# Patient Record
Sex: MALE | Race: BLACK OR AFRICAN AMERICAN | Employment: OTHER | URBAN - METROPOLITAN AREA
[De-identification: names, ages, dates, MRNs, and addresses within clinical notes are randomized per-mention and may not be internally consistent; named-entity substitution may affect disease eponyms.]

---

## 2017-02-11 ENCOUNTER — IP HISTORICAL/CONVERTED ENCOUNTER (OUTPATIENT)
Dept: OTHER | Age: 67
End: 2017-02-11

## 2022-10-07 ENCOUNTER — HOSPITAL ENCOUNTER (EMERGENCY)
Age: 72
Discharge: HOME | End: 2022-10-07
Attending: EMERGENCY MEDICINE | Admitting: EMERGENCY MEDICINE
Payer: MEDICARE

## 2022-10-07 ENCOUNTER — APPOINTMENT (OUTPATIENT)
Dept: GENERAL RADIOLOGY | Age: 72
End: 2022-10-07
Attending: EMERGENCY MEDICINE
Payer: MEDICARE

## 2022-10-07 ENCOUNTER — APPOINTMENT (OUTPATIENT)
Dept: CT IMAGING | Age: 72
End: 2022-10-07
Attending: EMERGENCY MEDICINE
Payer: MEDICARE

## 2022-10-07 VITALS
BODY MASS INDEX: 17.77 KG/M2 | TEMPERATURE: 97.8 F | OXYGEN SATURATION: 99 % | DIASTOLIC BLOOD PRESSURE: 91 MMHG | HEIGHT: 69 IN | SYSTOLIC BLOOD PRESSURE: 171 MMHG | RESPIRATION RATE: 15 BRPM | HEART RATE: 56 BPM | WEIGHT: 120 LBS

## 2022-10-07 DIAGNOSIS — C80.1 MALIGNANCY (HCC): Primary | ICD-10-CM

## 2022-10-07 DIAGNOSIS — R00.1 BRADYCARDIA: ICD-10-CM

## 2022-10-07 LAB
ALBUMIN SERPL-MCNC: 2.9 G/DL (ref 3.5–5)
ALBUMIN/GLOB SERPL: 0.9 {RATIO} (ref 1.1–2.2)
ALP SERPL-CCNC: 151 U/L (ref 45–117)
ALT SERPL-CCNC: 16 U/L (ref 12–78)
ANION GAP SERPL CALC-SCNC: 7 MMOL/L (ref 5–15)
AST SERPL W P-5'-P-CCNC: 26 U/L (ref 15–37)
ATRIAL RATE: 67 BPM
BASOPHILS # BLD: 0.1 K/UL (ref 0–0.1)
BASOPHILS NFR BLD: 1 % (ref 0–1)
BILIRUB SERPL-MCNC: 0.4 MG/DL (ref 0.2–1)
BUN SERPL-MCNC: 20 MG/DL (ref 6–20)
BUN/CREAT SERPL: 5 (ref 12–20)
CA-I BLD-MCNC: 9.4 MG/DL (ref 8.5–10.1)
CALCULATED P AXIS, ECG09: 60 DEGREES
CALCULATED R AXIS, ECG10: 53 DEGREES
CALCULATED T AXIS, ECG11: 74 DEGREES
CHLORIDE SERPL-SCNC: 105 MMOL/L (ref 97–108)
CO2 SERPL-SCNC: 27 MMOL/L (ref 21–32)
CREAT SERPL-MCNC: 4.07 MG/DL (ref 0.7–1.3)
DIAGNOSIS, 93000: NORMAL
DIFFERENTIAL METHOD BLD: ABNORMAL
EOSINOPHIL # BLD: 0.2 K/UL (ref 0–0.4)
EOSINOPHIL NFR BLD: 2 % (ref 0–7)
ERYTHROCYTE [DISTWIDTH] IN BLOOD BY AUTOMATED COUNT: 14.9 % (ref 11.5–14.5)
GLOBULIN SER CALC-MCNC: 3.4 G/DL (ref 2–4)
GLUCOSE SERPL-MCNC: 85 MG/DL (ref 65–100)
HCT VFR BLD AUTO: 38.2 % (ref 36.6–50.3)
HGB BLD-MCNC: 12.2 G/DL (ref 12.1–17)
IMM GRANULOCYTES # BLD AUTO: 0 K/UL (ref 0–0.04)
IMM GRANULOCYTES NFR BLD AUTO: 1 % (ref 0–0.5)
LYMPHOCYTES # BLD: 1.2 K/UL (ref 0.8–3.5)
LYMPHOCYTES NFR BLD: 14 % (ref 12–49)
MCH RBC QN AUTO: 29.3 PG (ref 26–34)
MCHC RBC AUTO-ENTMCNC: 31.9 G/DL (ref 30–36.5)
MCV RBC AUTO: 91.6 FL (ref 80–99)
MONOCYTES # BLD: 0.9 K/UL (ref 0–1)
MONOCYTES NFR BLD: 10 % (ref 5–13)
NEUTS SEG # BLD: 6.4 K/UL (ref 1.8–8)
NEUTS SEG NFR BLD: 72 % (ref 32–75)
NRBC # BLD: 0 K/UL (ref 0–0.01)
NRBC BLD-RTO: 0 PER 100 WBC
P-R INTERVAL, ECG05: 156 MS
PLATELET # BLD AUTO: 305 K/UL (ref 150–400)
PMV BLD AUTO: 11 FL (ref 8.9–12.9)
POTASSIUM SERPL-SCNC: 3.4 MMOL/L (ref 3.5–5.1)
PROT SERPL-MCNC: 6.3 G/DL (ref 6.4–8.2)
Q-T INTERVAL, ECG07: 434 MS
QRS DURATION, ECG06: 86 MS
QTC CALCULATION (BEZET), ECG08: 458 MS
RBC # BLD AUTO: 4.17 M/UL (ref 4.1–5.7)
SODIUM SERPL-SCNC: 139 MMOL/L (ref 136–145)
TROPONIN-HIGH SENSITIVITY: 114 NG/L (ref 0–76)
VENTRICULAR RATE, ECG03: 67 BPM
WBC # BLD AUTO: 8.8 K/UL (ref 4.1–11.1)

## 2022-10-07 PROCEDURE — 93005 ELECTROCARDIOGRAM TRACING: CPT

## 2022-10-07 PROCEDURE — 36415 COLL VENOUS BLD VENIPUNCTURE: CPT

## 2022-10-07 PROCEDURE — 71045 X-RAY EXAM CHEST 1 VIEW: CPT

## 2022-10-07 PROCEDURE — 99285 EMERGENCY DEPT VISIT HI MDM: CPT

## 2022-10-07 PROCEDURE — 80053 COMPREHEN METABOLIC PANEL: CPT

## 2022-10-07 PROCEDURE — 84484 ASSAY OF TROPONIN QUANT: CPT

## 2022-10-07 PROCEDURE — 71250 CT THORAX DX C-: CPT

## 2022-10-07 PROCEDURE — 85025 COMPLETE CBC W/AUTO DIFF WBC: CPT

## 2022-10-07 NOTE — DISCHARGE INSTRUCTIONS
Thank you! Thank you for allowing me to care for you in the emergency department. It is my goal to provide you with excellent care. If you have not received excellent quality care, please ask to speak to the nurse manager. Please fill out the survey that will come to you by mail or email since we listen to your feedback! Below you will find a list of your tests from today's visit. Should you have any questions, please do not hesitate to call the emergency department. Labs  Recent Results (from the past 12 hour(s))   EKG, 12 LEAD, INITIAL    Collection Time: 10/07/22 10:54 AM   Result Value Ref Range    Ventricular Rate 67 BPM    Atrial Rate 67 BPM    P-R Interval 156 ms    QRS Duration 86 ms    Q-T Interval 434 ms    QTC Calculation (Bezet) 458 ms    Calculated P Axis 60 degrees    Calculated R Axis 53 degrees    Calculated T Axis 74 degrees    Diagnosis       Sinus rhythm with occasional Premature ventricular complexes  Moderate voltage criteria for LVH, may be normal variant  Borderline ECG  No previous ECGs available  Confirmed by Kali Muro MD, Guadlupe Severs ([id number]) on 10/7/2022 11:77:66 PM     METABOLIC PANEL, COMPREHENSIVE    Collection Time: 10/07/22 12:11 PM   Result Value Ref Range    Sodium 139 136 - 145 mmol/L    Potassium 3.4 (L) 3.5 - 5.1 mmol/L    Chloride 105 97 - 108 mmol/L    CO2 27 21 - 32 mmol/L    Anion gap 7 5 - 15 mmol/L    Glucose 85 65 - 100 mg/dL    BUN 20 6 - 20 mg/dL    Creatinine 4.07 (H) 0.70 - 1.30 mg/dL    BUN/Creatinine ratio 5 (L) 12 - 20      eGFR 15 (L) >60 ml/min/1.73m2    Calcium 9.4 8.5 - 10.1 mg/dL    Bilirubin, total 0.4 0.2 - 1.0 mg/dL    AST (SGOT) 26 15 - 37 U/L    ALT (SGPT) 16 12 - 78 U/L    Alk.  phosphatase 151 (H) 45 - 117 U/L    Protein, total 6.3 (L) 6.4 - 8.2 g/dL    Albumin 2.9 (L) 3.5 - 5.0 g/dL    Globulin 3.4 2.0 - 4.0 g/dL    A-G Ratio 0.9 (L) 1.1 - 2.2     TROPONIN-HIGH SENSITIVITY    Collection Time: 10/07/22 12:11 PM   Result Value Ref Range    Troponin-High Sensitivity 114 (H) 0 - 76 ng/L   CBC WITH AUTOMATED DIFF    Collection Time: 10/07/22 12:11 PM   Result Value Ref Range    WBC 8.8 4.1 - 11.1 K/uL    RBC 4.17 4.10 - 5.70 M/uL    HGB 12.2 12.1 - 17.0 g/dL    HCT 38.2 36.6 - 50.3 %    MCV 91.6 80.0 - 99.0 FL    MCH 29.3 26.0 - 34.0 PG    MCHC 31.9 30.0 - 36.5 g/dL    RDW 14.9 (H) 11.5 - 14.5 %    PLATELET 787 347 - 145 K/uL    MPV 11.0 8.9 - 12.9 FL    NRBC 0.0 0.0  WBC    ABSOLUTE NRBC 0.00 0.00 - 0.01 K/uL    NEUTROPHILS 72 32 - 75 %    LYMPHOCYTES 14 12 - 49 %    MONOCYTES 10 5 - 13 %    EOSINOPHILS 2 0 - 7 %    BASOPHILS 1 0 - 1 %    IMMATURE GRANULOCYTES 1 (H) 0 - 0.5 %    ABS. NEUTROPHILS 6.4 1.8 - 8.0 K/UL    ABS. LYMPHOCYTES 1.2 0.8 - 3.5 K/UL    ABS. MONOCYTES 0.9 0.0 - 1.0 K/UL    ABS. EOSINOPHILS 0.2 0.0 - 0.4 K/UL    ABS. BASOPHILS 0.1 0.0 - 0.1 K/UL    ABS. IMM. GRANS. 0.0 0.00 - 0.04 K/UL    DF AUTOMATED         Radiologic Studies  CT CHEST WO CONT   Final Result   1. Previously noted possible mass in left upper lobe corresponds to a 3.8 x 4.1   cm mass. There are multiple other nodular masses. Findings are suggestive of   metastatic disease. The left upper lobe mass may represent a primary or   metastasis as well. 2.  Multiple liver masses are noted with perihepatic ascites. Large mass   infiltrating most of the right hepatic lobe. Questionable biliary dilatation. Further assessment is limited. XR CHEST PORT   Final Result   1. 4.5 cm left upper lobe mass. Chest CT with contrast (non-PE study) is   recommended to further evaluate            CT Results  (Last 48 hours)                 10/07/22 1255  CT CHEST WO CONT Final result    Impression:  1. Previously noted possible mass in left upper lobe corresponds to a 3.8 x 4.1   cm mass. There are multiple other nodular masses. Findings are suggestive of   metastatic disease. The left upper lobe mass may represent a primary or   metastasis as well.        2.  Multiple liver masses are noted with perihepatic ascites. Large mass   infiltrating most of the right hepatic lobe. Questionable biliary dilatation. Further assessment is limited. Narrative:  INDICATION: Chest mass       COMPARISON: Chest radiograph dated 10/7/2022       CONTRAST: None. TECHNIQUE:  5 mm axial images were obtained through the chest. Coronal and   sagittal reformats were generated. CT dose reduction was achieved through use   of a standardized protocol tailored for this examination and automatic exposure   control for dose modulation. The absence of intravenous contrast reduces the sensitivity for evaluation of   the mediastinum, danielle, vasculature, and upper abdominal organs. FINDINGS:       CHEST WALL: No axillary or supraclavicular adenopathy is identified. THYROID: Small hypodensity left thyroid lobe. MEDIASTINUM: No mass or lymphadenopathy. DANIELLE: No mass or lymphadenopathy. THORACIC AORTA: No aneurysm. MAIN PULMONARY ARTERY: Normal in caliber. TRACHEA/BRONCHI: Possible secretions within the right lower lobe bronchus. ESOPHAGUS: No wall thickening or dilatation. HEART: Coronary atherosclerotic disease. PLEURA: No effusion or pneumothorax. LUNGS: Multiple pulmonary nodules are noted scattered throughout the lungs. The   largest is in the left upper lobe measuring 8.8 x 4.1 cm. Right lower lobe   patchy airspace opacity likely volume loss. INCIDENTALLY IMAGED UPPER ABDOMEN: Multiple liver masses are noted with diffuse   infiltration of the right hepatic lobe. There is ascites in the upper abdomen. Possible biliary dilatation. Calcifications in the region of the pancreas. Further assessment is limited. BONES: No destructive bone lesion. CXR Results  (Last 48 hours)                 10/07/22 1119  XR CHEST PORT Final result    Impression:  1. 4.5 cm left upper lobe mass.  Chest CT with contrast (non-PE study) is   recommended to further evaluate Narrative:  INDICATION:  chest pain        Exam: Portable chest 1117. Comparison: None. Findings: Cardiomediastinal silhouette is within normal limits. Pulmonary   vasculature is not engorged. 4.5 cm left upper lobe mass. Atelectasis right base   without pneumothorax or effusion                 ------------------------------------------------------------------------------------------------------------  The exam and treatment you received in the Emergency Department were for an urgent problem and are not intended as complete care. It is important that you follow-up with a doctor, nurse practitioner, or physician assistant to:  (1) confirm your diagnosis,  (2) re-evaluation of changes in your illness and treatment, and  (3) for ongoing care. Please take your discharge instructions with you when you go to your follow-up appointment. If you have any problem arranging a follow-up appointment, contact the Emergency Department. If your symptoms become worse or you do not improve as expected and you are unable to reach your health care provider, please return to the Emergency Department. We are available 24 hours a day. If a prescription has been provided, please have it filled as soon as possible to prevent a delay in treatment. If you have any questions or reservations about taking the medication due to side effects or interactions with other medications, please call your primary care provider or contact the ER.

## 2022-10-07 NOTE — ED TRIAGE NOTES
EMS dispatched for c/o bradycardia during dialysis. EMS states that pt was in instead was in a SR with lots of PVC's. Pt has no complaints at all.   ( Also reports that fell one day last week and never go check out)

## 2022-10-08 NOTE — ED PROVIDER NOTES
EMERGENCY DEPARTMENT HISTORY AND PHYSICAL EXAM      Date: 10/7/2022  Patient Name: Roosevelt Verde    History of Presenting Illness     Chief Complaint   Patient presents with    Slow Heart Rate       History Provided By: Patient's Sister and EMS    HPI: Roosevelt Verde, 81 y.o. male with past medical history significant for stroke, CKD, ESRD on HD, hypertension presenting to the emergency department for evaluation of reported episode of bradycardia while in dialysis. History difficult to obtain from patient secondary to nonverbal status,  However dialysis nurse reports that patient's heart rate dropped into the mid 30s. No EKG was performed. Patient apparently without complaint. There are no other complaints, changes, or physical findings at this time. PCP: Sarah Niño MD        Past History   Past Medical History:  Past Medical History:   Diagnosis Date    Chronic kidney disease     Dialysis patient [facility])     Hypertension     Stroke [facility])        Past Surgical History:  History reviewed. No pertinent surgical history. Family History:  History reviewed. No pertinent family history. Social History:  Social History     Tobacco Use    Smoking status: Never    Smokeless tobacco: Never   Substance Use Topics    Alcohol use: Not Currently    Drug use: Never       Allergies:  No Known Allergies  Review of Systems   Review of Systems   Unable to perform ROS: Patient nonverbal     Physical Exam   Physical Exam  Constitutional:       General: He is not in acute distress. Appearance: Normal appearance. He is cachectic. He is ill-appearing (Chronically). HENT:      Head: Normocephalic and atraumatic. Right Ear: External ear normal.      Left Ear: External ear normal.      Nose: Nose normal.      Mouth/Throat:      Mouth: Mucous membranes are moist.   Eyes:      Extraocular Movements: Extraocular movements intact.       Conjunctiva/sclera: Conjunctivae normal.   Cardiovascular:      Rate and Rhythm: Normal rate and regular rhythm. Pulses: Normal pulses. Pulmonary:      Effort: Pulmonary effort is normal. No respiratory distress. Breath sounds: Normal breath sounds. Abdominal:      General: Abdomen is flat. There is no distension. Musculoskeletal:         General: Normal range of motion. Cervical back: Normal range of motion. Skin:     General: Skin is warm and dry. Neurological:      Mental Status: He is alert. Mental status is at baseline. Lab and Diagnostic Study Results   Labs -   No results found for this or any previous visit (from the past 12 hour(s)). Radiologic Studies -   [unfilled]  CT Results  (Last 48 hours)                 10/07/22 1255  CT CHEST WO CONT Final result    Impression:  1. Previously noted possible mass in left upper lobe corresponds to a 3.8 x 4.1   cm mass. There are multiple other nodular masses. Findings are suggestive of   metastatic disease. The left upper lobe mass may represent a primary or   metastasis as well. 2.  Multiple liver masses are noted with perihepatic ascites. Large mass   infiltrating most of the right hepatic lobe. Questionable biliary dilatation. Further assessment is limited. Narrative:  INDICATION: Chest mass       COMPARISON: Chest radiograph dated 10/7/2022       CONTRAST: None. TECHNIQUE:  5 mm axial images were obtained through the chest. Coronal and   sagittal reformats were generated. CT dose reduction was achieved through use   of a standardized protocol tailored for this examination and automatic exposure   control for dose modulation. The absence of intravenous contrast reduces the sensitivity for evaluation of   the mediastinum, danielle, vasculature, and upper abdominal organs. FINDINGS:       CHEST WALL: No axillary or supraclavicular adenopathy is identified. THYROID: Small hypodensity left thyroid lobe. MEDIASTINUM: No mass or lymphadenopathy.    DANIELLE: No mass or lymphadenopathy. THORACIC AORTA: No aneurysm. MAIN PULMONARY ARTERY: Normal in caliber. TRACHEA/BRONCHI: Possible secretions within the right lower lobe bronchus. ESOPHAGUS: No wall thickening or dilatation. HEART: Coronary atherosclerotic disease. PLEURA: No effusion or pneumothorax. LUNGS: Multiple pulmonary nodules are noted scattered throughout the lungs. The   largest is in the left upper lobe measuring 8.8 x 4.1 cm. Right lower lobe   patchy airspace opacity likely volume loss. INCIDENTALLY IMAGED UPPER ABDOMEN: Multiple liver masses are noted with diffuse   infiltration of the right hepatic lobe. There is ascites in the upper abdomen. Possible biliary dilatation. Calcifications in the region of the pancreas. Further assessment is limited. BONES: No destructive bone lesion. CXR Results  (Last 48 hours)                 10/07/22 1119  XR CHEST PORT Final result    Impression:  1. 4.5 cm left upper lobe mass. Chest CT with contrast (non-PE study) is   recommended to further evaluate           Narrative:  INDICATION:  chest pain        Exam: Portable chest 1117. Comparison: None. Findings: Cardiomediastinal silhouette is within normal limits. Pulmonary   vasculature is not engorged. 4.5 cm left upper lobe mass. Atelectasis right base   without pneumothorax or effusion                   Medical Decision Making and ED Course   Differential Diagnosis & Medical Decision Making Provider Note:   82-year-old male presenting to the emergency department for apparent episode of bradycardia while in dialysis. Reports of patient's heart rate dropping into the 30s. Patient with no apparent complaint, however history is somewhat limited secondary to patient's nonverbal status. Initial EKG demonstrating sinus rhythm with occasional PVCs. Patient without bradycardia throughout his stay in the emergency department.   Laboratory evaluation with mildly elevated troponin consistent with patient's history of ESRD. Chest x-ray demonstrating pulmonary mass. CT of the chest demonstrating findings consistent/concerning for metastatic disease. Liver also noted to be affected. Patient and Renee Lima were offered admission for further investigation into suspected malignancy, however would prefer to follow-up on outpatient basis as they did not want any invasive testing or aggressive treatment at this time. Patient can follow-up on outpatient basis with cardiology for evaluation of bradycardia. - I am the first and primary provider for this patient. I reviewed the vital signs, available nursing notes, past medical history, past surgical history, family history and social history. The patient's presenting problems have been discussed, and the staff are in agreement with the care plan formulated and outlined with them. I have encouraged them to ask questions as they arise throughout their visit. Vital Signs-Reviewed the patient's vital signs. No data found. EKG interpretation: (Preliminary): EKG Interpreted by me. Shows sinus rhythm with occasional PVCs. Ventricular rate 67, , QRS 86, Qtc 458    ED Course:   ED Course as of 10/08/22 1850   Fri Oct 07, 2022   1429 DPOA at bedside does not wish for admission for malignant work-up at this time. Requesting discharge with outpatient resources. [RS]      ED Course User Index  [RS] Chuyalysha Naidu DO           Procedures and Critical Care     Performed by: Jarett Wilks DO  Procedures        Disposition   Disposition: Condition stable  DC- Adult Discharges: All of the diagnostic tests were reviewed and questions answered. Diagnosis, care plan and treatment options were discussed. The patient understands the instructions and will follow up as directed. The patients results have been reviewed with them. They have been counseled regarding their diagnosis.   The patient and family member patient and sister verbally convey understanding and agreement of the signs, symptoms, diagnosis, treatment and prognosis and additionally agrees to follow up as recommended with their PCP in 24 - 48 hours. They also agree with the care-plan and convey that all of their questions have been answered. I have also put together some discharge instructions for them that include: 1) educational information regarding their diagnosis, 2) how to care for their diagnosis at home, as well a 3) list of reasons why they would want to return to the ED prior to their follow-up appointment, should their condition change. DC-The patient and sister was given verbal follow-up instructions    DISCHARGE PLAN:  1. Cannot display discharge medications since this patient is not currently admitted. 2.   Follow-up Information       Follow up With Specialties Details Why [facility] EMERGENCY DEPT Emergency Medicine  As needed, If symptoms worsen [address] [facility] Tylor Miller MD Hematology and Oncology Schedule an appointment as soon as possible for a visit   [facility]  [phone]            3. Return to ED if worse   4. There are no discharge medications for this patient. Remove if admitted/discharged    Diagnosis/Clinical Impression     Clinical Impression:   1. Malignancy ([address]    2. Bradycardia        Attestations: Genaro Edward, DO, am the primary clinician of record. Please note that this dictation was completed with [employer], the computer voice recognition software. Quite often unanticipated grammatical, syntax, homophones, and other interpretive errors are inadvertently transcribed by the computer software. Please disregard these errors. Please excuse any errors that have escaped final proofreading. Thank you.

## 2022-10-23 ENCOUNTER — APPOINTMENT (OUTPATIENT)
Dept: GENERAL RADIOLOGY | Age: 72
DRG: 180 | End: 2022-10-23
Attending: STUDENT IN AN ORGANIZED HEALTH CARE EDUCATION/TRAINING PROGRAM
Payer: MEDICARE

## 2022-10-23 ENCOUNTER — HOSPITAL ENCOUNTER (INPATIENT)
Age: 72
LOS: 3 days | Discharge: HOSPICE | DRG: 180 | End: 2022-10-27
Attending: STUDENT IN AN ORGANIZED HEALTH CARE EDUCATION/TRAINING PROGRAM | Admitting: FAMILY MEDICINE
Payer: MEDICARE

## 2022-10-23 DIAGNOSIS — I10 HYPERTENSION, UNSPECIFIED TYPE: ICD-10-CM

## 2022-10-23 DIAGNOSIS — R07.9 CHEST PAIN, UNSPECIFIED TYPE: Primary | ICD-10-CM

## 2022-10-23 DIAGNOSIS — R77.8 ELEVATED TROPONIN: ICD-10-CM

## 2022-10-23 LAB
ANION GAP SERPL CALC-SCNC: 3 MMOL/L (ref 5–15)
ATRIAL RATE: 76 BPM
BASOPHILS # BLD: 0 K/UL (ref 0–0.1)
BASOPHILS NFR BLD: 0 % (ref 0–1)
BUN SERPL-MCNC: 62 MG/DL (ref 6–20)
BUN/CREAT SERPL: 9 (ref 12–20)
CA-I BLD-MCNC: 10.8 MG/DL (ref 8.5–10.1)
CALCULATED P AXIS, ECG09: 74 DEGREES
CALCULATED R AXIS, ECG10: 63 DEGREES
CALCULATED T AXIS, ECG11: 83 DEGREES
CHLORIDE SERPL-SCNC: 99 MMOL/L (ref 97–108)
CO2 SERPL-SCNC: 26 MMOL/L (ref 21–32)
CREAT SERPL-MCNC: 7.03 MG/DL (ref 0.7–1.3)
DIAGNOSIS, 93000: NORMAL
DIFFERENTIAL METHOD BLD: ABNORMAL
EOSINOPHIL # BLD: 0 K/UL (ref 0–0.4)
EOSINOPHIL NFR BLD: 0 % (ref 0–7)
ERYTHROCYTE [DISTWIDTH] IN BLOOD BY AUTOMATED COUNT: 16.6 % (ref 11.5–14.5)
GLUCOSE SERPL-MCNC: 97 MG/DL (ref 65–100)
HCT VFR BLD AUTO: 39.9 % (ref 36.6–50.3)
HGB BLD-MCNC: 12.8 G/DL (ref 12.1–17)
IMM GRANULOCYTES # BLD AUTO: 0.1 K/UL (ref 0–0.04)
IMM GRANULOCYTES NFR BLD AUTO: 0 % (ref 0–0.5)
LYMPHOCYTES # BLD: 1.4 K/UL (ref 0.8–3.5)
LYMPHOCYTES NFR BLD: 10 % (ref 12–49)
MAGNESIUM SERPL-MCNC: 2.7 MG/DL (ref 1.6–2.4)
MAGNESIUM SERPL-MCNC: NORMAL MG/DL (ref 1.6–2.4)
MCH RBC QN AUTO: 29.9 PG (ref 26–34)
MCHC RBC AUTO-ENTMCNC: 32.1 G/DL (ref 30–36.5)
MCV RBC AUTO: 93.2 FL (ref 80–99)
MONOCYTES # BLD: 1.2 K/UL (ref 0–1)
MONOCYTES NFR BLD: 8 % (ref 5–13)
NEUTS SEG # BLD: 11.9 K/UL (ref 1.8–8)
NEUTS SEG NFR BLD: 82 % (ref 32–75)
NRBC # BLD: 0 K/UL (ref 0–0.01)
NRBC BLD-RTO: 0 PER 100 WBC
P-R INTERVAL, ECG05: 150 MS
PLATELET # BLD AUTO: 295 K/UL (ref 150–400)
PMV BLD AUTO: 11.2 FL (ref 8.9–12.9)
POTASSIUM SERPL-SCNC: 5.2 MMOL/L (ref 3.5–5.1)
POTASSIUM SERPL-SCNC: ABNORMAL MMOL/L (ref 3.5–5.1)
Q-T INTERVAL, ECG07: 374 MS
QRS DURATION, ECG06: 84 MS
QTC CALCULATION (BEZET), ECG08: 420 MS
RBC # BLD AUTO: 4.28 M/UL (ref 4.1–5.7)
SODIUM SERPL-SCNC: 128 MMOL/L (ref 136–145)
TROPONIN-HIGH SENSITIVITY: 112 NG/L (ref 0–76)
TROPONIN-HIGH SENSITIVITY: NORMAL NG/L (ref 0–76)
VENTRICULAR RATE, ECG03: 76 BPM
WBC # BLD AUTO: 14.6 K/UL (ref 4.1–11.1)

## 2022-10-23 PROCEDURE — 83735 ASSAY OF MAGNESIUM: CPT

## 2022-10-23 PROCEDURE — 99285 EMERGENCY DEPT VISIT HI MDM: CPT

## 2022-10-23 PROCEDURE — 84484 ASSAY OF TROPONIN QUANT: CPT

## 2022-10-23 PROCEDURE — G0378 HOSPITAL OBSERVATION PER HR: HCPCS

## 2022-10-23 PROCEDURE — 74011250637 HC RX REV CODE- 250/637: Performed by: STUDENT IN AN ORGANIZED HEALTH CARE EDUCATION/TRAINING PROGRAM

## 2022-10-23 PROCEDURE — 71045 X-RAY EXAM CHEST 1 VIEW: CPT

## 2022-10-23 PROCEDURE — 93005 ELECTROCARDIOGRAM TRACING: CPT

## 2022-10-23 PROCEDURE — 84132 ASSAY OF SERUM POTASSIUM: CPT

## 2022-10-23 PROCEDURE — 36415 COLL VENOUS BLD VENIPUNCTURE: CPT

## 2022-10-23 PROCEDURE — 85025 COMPLETE CBC W/AUTO DIFF WBC: CPT

## 2022-10-23 RX ORDER — GUAIFENESIN 100 MG/5ML
324 LIQUID (ML) ORAL
Status: COMPLETED | OUTPATIENT
Start: 2022-10-23 | End: 2022-10-23

## 2022-10-23 RX ADMIN — ASPIRIN 81 MG 324 MG: 81 TABLET ORAL at 23:57

## 2022-10-23 NOTE — Clinical Note
Patient Class[de-identified] OBSERVATION [104]   Type of Bed: Telemetry [19]   Cardiac Monitoring Required?: Yes   Reason for Observation: Chest Pain, elevated trop   Admitting Diagnosis: Chest pain [203087]   Admitting Physician: Isabella Baird [[id number]]   Attending Physician: Isabella Baird [[id number]]

## 2022-10-23 NOTE — ED PROVIDER NOTES
[facility]  EMERGENCY DEPARTMENT ENCOUNTER NOTE        Date: 10/23/2022  Patient Name: Corey Lindsey      History of Presenting Illness     Chief Complaint   Patient presents with    Chest Pain (Angina)       History Provided By: Patient    HPI: Corey Lindsey, 81 y.o. male with history of HTN, CKD on HD, and CVA who presents to the ED with chest pain. He is unable to characterize his chest pain. Started a few hours ago, nonradiating, nothing specific makes it better or worse without associated fever, chills or sweats. Patient feels generalized weakness and fatigue since he had his dialysis couple of days ago. He does not have any abdominal pain. Any shortness of breath more than usual, any lower extremity swelling or pain. There are no other complaints, changes, or physical findings at this time. PCP: Richard Figueroa MD        Past History     Past Medical History:  Past Medical History:   Diagnosis Date    Chronic kidney disease     Dialysis patient [facility])     Hypertension     Stroke [facility])        Past Surgical History:  No past surgical history on file. Family History:  History reviewed. No pertinent family history. Social History:  Social History     Tobacco Use    Smoking status: Never    Smokeless tobacco: Never   Substance Use Topics    Alcohol use: Not Currently    Drug use: Never       Allergies:  No Known Allergies      Review of Systems     Review of Systems    A 10 point review of system was performed and was negative except as noted above in HPI    Physical Exam     Physical Exam  Vitals and nursing note reviewed. Constitutional:       General: He is not in acute distress. Appearance: He is underweight. He is not ill-appearing, toxic-appearing or diaphoretic. HENT:      Head: Normocephalic and atraumatic. Cardiovascular:      Rate and Rhythm: Normal rate and regular rhythm. Heart sounds: Normal heart sounds.       Arteriovenous access: Left arteriovenous access is present. Pulmonary:      Effort: Pulmonary effort is normal.      Breath sounds: Normal breath sounds. Abdominal:      Palpations: Abdomen is soft. Tenderness: There is no abdominal tenderness. Musculoskeletal:      Cervical back: Normal range of motion and neck supple. Right lower leg: No tenderness. No edema. Left lower leg: No tenderness. No edema. Skin:     General: Skin is warm and dry. Neurological:      Mental Status: He is alert and oriented to person, place, and time.        Lab and Diagnostic Study Results     Labs -     Recent Results (from the past 12 hour(s))   EKG, 12 LEAD, INITIAL    Collection Time: 10/23/22  6:19 PM   Result Value Ref Range    Ventricular Rate 76 BPM    Atrial Rate 76 BPM    P-R Interval 150 ms    QRS Duration 84 ms    Q-T Interval 374 ms    QTC Calculation (Bezet) 420 ms    Calculated P Axis 74 degrees    Calculated R Axis 63 degrees    Calculated T Axis 83 degrees    Diagnosis       Normal sinus rhythm  Minimal voltage criteria for LVH, may be normal variant  Borderline ECG  When compared with ECG of 07-OCT-2022 10:54,  Premature ventricular complexes are no longer Present  Confirmed by Hedy Sandhu MD EM ([id number]) on 10/23/2022 10:45:37 PM     MAGNESIUM    Collection Time: 10/23/22  6:46 PM   Result Value Ref Range    Magnesium Hemolyzed, recollect requested 1.6 - 2.4 mg/dL   TROPONIN-HIGH SENSITIVITY    Collection Time: 10/23/22  6:46 PM   Result Value Ref Range    Troponin-High Sensitivity Hemolyzed, recollect requested 0 - 76 ng/L   METABOLIC PANEL, BASIC    Collection Time: 10/23/22  6:46 PM   Result Value Ref Range    Sodium 128 (L) 136 - 145 mmol/L    Potassium Hemolyzed, recollect requested 3.5 - 5.1 mmol/L    Chloride 99 97 - 108 mmol/L    CO2 26 21 - 32 mmol/L    Anion gap 3 (L) 5 - 15 mmol/L    Glucose 97 65 - 100 mg/dL    BUN 62 (H) 6 - 20 mg/dL    Creatinine 7.03 (H) 0.70 - 1.30 mg/dL    BUN/Creatinine ratio 9 (L) 12 - 20      eGFR 8 (L) >60 ml/min/1.73m2    Calcium 10.8 (H) 8.5 - 10.1 mg/dL   CBC WITH AUTOMATED DIFF    Collection Time: 10/23/22  6:46 PM   Result Value Ref Range    WBC 14.6 (H) 4.1 - 11.1 K/uL    RBC 4.28 4.10 - 5.70 M/uL    HGB 12.8 12.1 - 17.0 g/dL    HCT 39.9 36.6 - 50.3 %    MCV 93.2 80.0 - 99.0 FL    MCH 29.9 26.0 - 34.0 PG    MCHC 32.1 30.0 - 36.5 g/dL    RDW 16.6 (H) 11.5 - 14.5 %    PLATELET 360 539 - 308 K/uL    MPV 11.2 8.9 - 12.9 FL    NRBC 0.0 0.0  WBC    ABSOLUTE NRBC 0.00 0.00 - 0.01 K/uL    NEUTROPHILS 82 (H) 32 - 75 %    LYMPHOCYTES 10 (L) 12 - 49 %    MONOCYTES 8 5 - 13 %    EOSINOPHILS 0 0 - 7 %    BASOPHILS 0 0 - 1 %    IMMATURE GRANULOCYTES 0 0 - 0.5 %    ABS. NEUTROPHILS 11.9 (H) 1.8 - 8.0 K/UL    ABS. LYMPHOCYTES 1.4 0.8 - 3.5 K/UL    ABS. MONOCYTES 1.2 (H) 0.0 - 1.0 K/UL    ABS. EOSINOPHILS 0.0 0.0 - 0.4 K/UL    ABS. BASOPHILS 0.0 0.0 - 0.1 K/UL    ABS. IMM. GRANS. 0.1 (H) 0.00 - 0.04 K/UL    DF AUTOMATED     POTASSIUM    Collection Time: 10/23/22  9:37 PM   Result Value Ref Range    Potassium 5.2 (H) 3.5 - 5.1 mmol/L   MAGNESIUM    Collection Time: 10/23/22  9:37 PM   Result Value Ref Range    Magnesium 2.7 (H) 1.6 - 2.4 mg/dL   TROPONIN-HIGH SENSITIVITY    Collection Time: 10/23/22  9:37 PM   Result Value Ref Range    Troponin-High Sensitivity 112 (H) 0 - 76 ng/L       Radiologic Studies -   [unfilled]  CT Results  (Last 48 hours)      None          CXR Results  (Last 48 hours)                 10/23/22 1914  XR CHEST PORT Final result    Impression:  1. Left-sided mass is unchanged   2. Right basilar atelectasis       Narrative:  INDICATION:  CP        Exam: Portable chest 1911. Comparison: 10/7/2022. Findings: Cardiomediastinal silhouette is within normal limits. Pulmonary   vasculature is not engorged. Increasing atelectasis at the right lung base. Left   upper lobe mass and nodule at the left base unchanged.  No pneumothorax or   definite effusion Medical Decision Making and ED Course   - I am the first and primary provider for this patient AND AM THE PRIMARY PROVIDER OF RECORD. - I reviewed the vital signs, available nursing notes, past medical history, past surgical history, family history and social history. - Initial assessment performed. The patients presenting problems have been discussed, and the staff are in agreement with the care plan formulated and outlined with them. I have encouraged them to ask questions as they arise throughout their visit. Vital Signs-Reviewed the patient's vital signs. Patient Vitals for the past 24 hrs:   Temp Pulse Resp BP SpO2   10/23/22 2144 98.7 °F (37.1 °C) 65 20 (!) 202/98 99 %   10/23/22 1816 98.8 °F (37.1 °C) 75 16 (!) 155/94 99 %       Records Reviewed: Nursing Notes and Old Medical Records    Provider Notes (Medical Decision Making):     Patient is 68-year-old male with past medical history as above presents intubated due to workweek chest pain. Aside from hypertension he appears hemodynamically stable. His pain does not appear to be radiating and does not have significant red flags. However, patient has significant risk factors for coronary artery disease. Plan is to place a BC, critical troponin, chest x-ray and EKG. Other differential diagnoses aside from ACS were considered within does not appear to be likely at this point. ED course:    EKG was obtained at 6:28 PM interpreted by me as NSR rate 67, intervals were normal, normal axis, no specific ST elevation or depression noted criteria, no signs of dysrhythmia or blocks. ED work-up revealed that the patient has elevated troponin. This could be secondary to impaired clearance from his CKD. However, in the setting cough chest pain, ACS cannot be completely ruled out on this the patient will need to be admitted to the hospital for chest pain. Composition of the HEART score for chest pain, angina, NSTEMI in the ED.     ED Heart Score  History: Slightly or Non-suspicious  ECG: Normal  Age: Greater than or equal to 65 years  Risk Factors: Greater than or equal to 3 risk factors, or history of atherosclerotic disease  Troponin: Less than or equal to normal limit  HEART Score Total : 4    0-3: 0.9-1.7% risk of adverse cardiac event. In the HEART Score, these patients were  discharged. 4-6: 12-16.6% risk of adverse cardiac event. In the HEART Score, these patients were  admitted to the hospital.   =7: 50-65% risk of adverse cardiac event. In the HEART Score, these patients were  candidates for early invasive measures. Diagnosis     Clinical Impression:   1. Chest pain, unspecified type    2. Elevated troponin    3. Hypertension, unspecified type        Disposition     Disposition: Condition stable    Admitted    Attestations: Alesha Retana MD    Please note that this dictation was completed with Sape, the computer voice recognition software. Quite often unanticipated grammatical, syntax, homophones, and other interpretive errors are inadvertently transcribed by the computer software. Please disregard these errors. Please excuse any errors that have escaped final proofreading. Thank you.

## 2022-10-24 LAB
ANION GAP SERPL CALC-SCNC: 8 MMOL/L (ref 5–15)
ATRIAL RATE: 67 BPM
BUN SERPL-MCNC: 71 MG/DL (ref 6–20)
BUN/CREAT SERPL: 9 (ref 12–20)
CA-I BLD-MCNC: 10.9 MG/DL (ref 8.5–10.1)
CALCULATED P AXIS, ECG09: 67 DEGREES
CALCULATED R AXIS, ECG10: 58 DEGREES
CALCULATED T AXIS, ECG11: 72 DEGREES
CHLORIDE SERPL-SCNC: 102 MMOL/L (ref 97–108)
CO2 SERPL-SCNC: 26 MMOL/L (ref 21–32)
CREAT SERPL-MCNC: 7.5 MG/DL (ref 0.7–1.3)
DIAGNOSIS, 93000: NORMAL
ERYTHROCYTE [DISTWIDTH] IN BLOOD BY AUTOMATED COUNT: 16.3 % (ref 11.5–14.5)
GLUCOSE SERPL-MCNC: 90 MG/DL (ref 65–100)
HCT VFR BLD AUTO: 36.6 % (ref 36.6–50.3)
HGB BLD-MCNC: 11.9 G/DL (ref 12.1–17)
MCH RBC QN AUTO: 29.9 PG (ref 26–34)
MCHC RBC AUTO-ENTMCNC: 32.5 G/DL (ref 30–36.5)
MCV RBC AUTO: 92 FL (ref 80–99)
NRBC # BLD: 0 K/UL (ref 0–0.01)
NRBC BLD-RTO: 0 PER 100 WBC
P-R INTERVAL, ECG05: 154 MS
PLATELET # BLD AUTO: 285 K/UL (ref 150–400)
PMV BLD AUTO: 11.3 FL (ref 8.9–12.9)
POTASSIUM SERPL-SCNC: 5.5 MMOL/L (ref 3.5–5.1)
Q-T INTERVAL, ECG07: 398 MS
QRS DURATION, ECG06: 94 MS
QTC CALCULATION (BEZET), ECG08: 420 MS
RBC # BLD AUTO: 3.98 M/UL (ref 4.1–5.7)
SODIUM SERPL-SCNC: 136 MMOL/L (ref 136–145)
TROPONIN-HIGH SENSITIVITY: 114 NG/L (ref 0–76)
TROPONIN-HIGH SENSITIVITY: 121 NG/L (ref 0–76)
TROPONIN-HIGH SENSITIVITY: 124 NG/L (ref 0–76)
VENTRICULAR RATE, ECG03: 67 BPM
WBC # BLD AUTO: 10.9 K/UL (ref 4.1–11.1)

## 2022-10-24 PROCEDURE — 74011250637 HC RX REV CODE- 250/637: Performed by: FAMILY MEDICINE

## 2022-10-24 PROCEDURE — 65270000029 HC RM PRIVATE

## 2022-10-24 PROCEDURE — 36415 COLL VENOUS BLD VENIPUNCTURE: CPT

## 2022-10-24 PROCEDURE — G0378 HOSPITAL OBSERVATION PER HR: HCPCS

## 2022-10-24 PROCEDURE — 96372 THER/PROPH/DIAG INJ SC/IM: CPT

## 2022-10-24 PROCEDURE — 74011250636 HC RX REV CODE- 250/636: Performed by: FAMILY MEDICINE

## 2022-10-24 PROCEDURE — 85027 COMPLETE CBC AUTOMATED: CPT

## 2022-10-24 PROCEDURE — 74011000250 HC RX REV CODE- 250: Performed by: FAMILY MEDICINE

## 2022-10-24 PROCEDURE — G0257 UNSCHED DIALYSIS ESRD PT HOS: HCPCS

## 2022-10-24 PROCEDURE — 92610 EVALUATE SWALLOWING FUNCTION: CPT

## 2022-10-24 PROCEDURE — 80048 BASIC METABOLIC PNL TOTAL CA: CPT

## 2022-10-24 PROCEDURE — 90935 HEMODIALYSIS ONE EVALUATION: CPT

## 2022-10-24 PROCEDURE — 84484 ASSAY OF TROPONIN QUANT: CPT

## 2022-10-24 RX ORDER — ATORVASTATIN CALCIUM 40 MG/1
40 TABLET, FILM COATED ORAL DAILY
Status: DISCONTINUED | OUTPATIENT
Start: 2022-10-24 | End: 2022-10-27 | Stop reason: HOSPADM

## 2022-10-24 RX ORDER — AMLODIPINE BESYLATE 10 MG/1
10 TABLET ORAL DAILY
COMMUNITY
End: 2022-10-27

## 2022-10-24 RX ORDER — GUAIFENESIN 100 MG/5ML
81 LIQUID (ML) ORAL DAILY
Status: DISCONTINUED | OUTPATIENT
Start: 2022-10-24 | End: 2022-10-27 | Stop reason: HOSPADM

## 2022-10-24 RX ORDER — HYDRALAZINE HYDROCHLORIDE 50 MG/1
50 TABLET, FILM COATED ORAL
Status: COMPLETED | OUTPATIENT
Start: 2022-10-24 | End: 2022-10-24

## 2022-10-24 RX ORDER — HYDRALAZINE HYDROCHLORIDE 50 MG/1
50 TABLET, FILM COATED ORAL 3 TIMES DAILY
Status: DISCONTINUED | OUTPATIENT
Start: 2022-10-24 | End: 2022-10-27 | Stop reason: HOSPADM

## 2022-10-24 RX ORDER — LATANOPROST 50 UG/ML
1 SOLUTION/ DROPS OPHTHALMIC EVERY EVENING
Status: DISCONTINUED | OUTPATIENT
Start: 2022-10-24 | End: 2022-10-27 | Stop reason: HOSPADM

## 2022-10-24 RX ORDER — SEVELAMER CARBONATE 800 MG/1
800 TABLET, FILM COATED ORAL
Status: DISCONTINUED | OUTPATIENT
Start: 2022-10-24 | End: 2022-10-27 | Stop reason: HOSPADM

## 2022-10-24 RX ORDER — TAMSULOSIN HYDROCHLORIDE 0.4 MG/1
0.4 CAPSULE ORAL 2 TIMES DAILY
COMMUNITY

## 2022-10-24 RX ORDER — SEVELAMER HYDROCHLORIDE 400 MG/1
TABLET, FILM COATED ORAL
COMMUNITY
End: 2022-10-27

## 2022-10-24 RX ORDER — TAMSULOSIN HYDROCHLORIDE 0.4 MG/1
0.4 CAPSULE ORAL 2 TIMES DAILY
Status: DISCONTINUED | OUTPATIENT
Start: 2022-10-24 | End: 2022-10-27 | Stop reason: HOSPADM

## 2022-10-24 RX ORDER — LATANOPROST 50 UG/ML
1 SOLUTION/ DROPS OPHTHALMIC
COMMUNITY

## 2022-10-24 RX ORDER — SODIUM POLYSTYRENE SULFONATE 15 G/60ML
30 SUSPENSION ORAL; RECTAL
Status: DISCONTINUED | OUTPATIENT
Start: 2022-10-24 | End: 2022-10-24

## 2022-10-24 RX ORDER — HEPARIN SODIUM 5000 [USP'U]/ML
5000 INJECTION, SOLUTION INTRAVENOUS; SUBCUTANEOUS EVERY 8 HOURS
Status: DISCONTINUED | OUTPATIENT
Start: 2022-10-24 | End: 2022-10-27 | Stop reason: HOSPADM

## 2022-10-24 RX ORDER — AMLODIPINE BESYLATE 5 MG/1
10 TABLET ORAL
Status: COMPLETED | OUTPATIENT
Start: 2022-10-24 | End: 2022-10-24

## 2022-10-24 RX ORDER — AMLODIPINE BESYLATE 5 MG/1
10 TABLET ORAL DAILY
Status: DISCONTINUED | OUTPATIENT
Start: 2022-10-24 | End: 2022-10-25

## 2022-10-24 RX ORDER — GUAIFENESIN 100 MG/5ML
81 LIQUID (ML) ORAL DAILY
COMMUNITY

## 2022-10-24 RX ADMIN — LATANOPROST 1 DROP: 50 SOLUTION OPHTHALMIC at 18:09

## 2022-10-24 RX ADMIN — HEPARIN SODIUM 5000 UNITS: 5000 INJECTION INTRAVENOUS; SUBCUTANEOUS at 14:22

## 2022-10-24 RX ADMIN — TAMSULOSIN HYDROCHLORIDE 0.4 MG: 0.4 CAPSULE ORAL at 15:50

## 2022-10-24 RX ADMIN — SEVELAMER CARBONATE 800 MG: 800 TABLET, FILM COATED ORAL at 17:10

## 2022-10-24 RX ADMIN — HYDRALAZINE HYDROCHLORIDE 50 MG: 50 TABLET, FILM COATED ORAL at 21:36

## 2022-10-24 RX ADMIN — AMLODIPINE BESYLATE 10 MG: 5 TABLET ORAL at 02:23

## 2022-10-24 RX ADMIN — AMLODIPINE BESYLATE 10 MG: 5 TABLET ORAL at 15:50

## 2022-10-24 RX ADMIN — HYDRALAZINE HYDROCHLORIDE 50 MG: 50 TABLET, FILM COATED ORAL at 15:50

## 2022-10-24 RX ADMIN — HEPARIN SODIUM 5000 UNITS: 5000 INJECTION INTRAVENOUS; SUBCUTANEOUS at 05:59

## 2022-10-24 RX ADMIN — ATORVASTATIN CALCIUM 40 MG: 40 TABLET, FILM COATED ORAL at 15:50

## 2022-10-24 RX ADMIN — ASPIRIN 81 MG CHEWABLE TABLET 81 MG: 81 TABLET CHEWABLE at 15:50

## 2022-10-24 RX ADMIN — HYDRALAZINE HYDROCHLORIDE 50 MG: 50 TABLET, FILM COATED ORAL at 02:23

## 2022-10-24 RX ADMIN — TAMSULOSIN HYDROCHLORIDE 0.4 MG: 0.4 CAPSULE ORAL at 21:37

## 2022-10-24 RX ADMIN — HEPARIN SODIUM 5000 UNITS: 5000 INJECTION INTRAVENOUS; SUBCUTANEOUS at 21:37

## 2022-10-24 NOTE — DIALYSIS
Hepatitis b surface Ag negative, drawn at [address] 10/5/2022

## 2022-10-24 NOTE — DIALYSIS
Pt tolerated treatment no complaints voiced during or post treatment. Removed 1.0kg during this treatment due to hypotension. Dr Bereket Medina made rounds during pt treatment. V/S stable post treatment. Pt discharged to room via hospital transpotation. Several attempts made to give Nurse Alexia Esteban report post treatment no answer via phone.

## 2022-10-24 NOTE — PROGRESS NOTES
Reason for Admission:  weakness and fatigue. RUR Score:   n/a                  Plan for utilizing home health:      none    PCP: First and Last name:  Flo Clancy   Name of Practice:    Are you a current patient: Yes/No: yes   Approximate date of last visit: 1 month   Can you participate in a virtual visit with your PCP:                     Current Advanced Directive/Advance Care Plan:     Healthcare Decision Maker:   Click here to complete [address] including selection of the Healthcare Decision Maker Relationship (ie \"Primary\")           Yohan Nunez [phone]( sister and legal guardian.)                    Transition of Care Plan:                    Patient has a history of stroke in 2013 and has since lived with his sister, who cares for him. Information obtained from sister as patient does not speak much. Demographic information verified by sister. Sister states they live in a one story home with 2 steps into the entrance. Patient recently unable to stand well and knees buckle. Patient currently has a rollator, rolling walker and shower chair, but no other DME. Patient is a M-W-F 6 am hemodialysis patient at [address] [facility] [address] in [place]. Patient has an aid from [address] for 6 hours per day M-F. Per sister, patient able to feed himself at times but sometimes hold food in his mouth and will not swallow. Sister currently transports to dialysis but checking to see if ambulance can transport since patient is barely able to walk at this time.

## 2022-10-24 NOTE — PROGRESS NOTES
Patient failed RN bedside swallow eval, holding PO meds until he can be seen by SLP, MD at bedside and aware.

## 2022-10-24 NOTE — ROUTINE PROCESS
TRANSFER - OUT REPORT:    Verbal report given to Aung(name) on Marleen Barton  being transferred to [facility](unit) for routine progression of care       Report consisted of patients Situation, Background, Assessment and   Recommendations(SBAR). Information from the following report(s) SBAR, Kardex, and ED Summary was reviewed with the receiving nurse. Lines:   Peripheral IV 10/23/22 Right Forearm (Active)   Site Assessment Clean, dry, & intact 10/24/22 0033   Phlebitis Assessment 0 10/24/22 0033        Opportunity for questions and clarification was provided.       Patient transported with:   [employer]

## 2022-10-24 NOTE — PROGRESS NOTES
SPEECH LANGUAGE PATHOLOGY BEDSIDE SWALLOW EVALUATION  Patient: Allyssa Reis (8 y.o. male)  Date: 10/24/2022  Primary Diagnosis: Chest pain [R07.9]       Precautions: aspiration       ASSESSMENT :  Based on the objective data described below, the patient presents with moderate oral and pharyngeal dysphagia. Patient w/ clinical indicators of penetration and aspiration at beside c/b cough response w/ thin trials. Decreased respiratory strength and drive to elicit strong cough, placing patient at increased risk of aspiration. Patient appears thin and frail. Decreased strength and ROM of oral musculature. Decreased labial seal w/ anterior spillage right side w/ thin liquid trials via cup. Rapid swallows. Decreased pharyngeal response to digital palpation and auscultation. Bolus control improves w/ moderately thick liquids w/ no overt s/sx of aspiration and penetration. Patient tolerated puree trials w/ slow A-P transit and swallow delay. Concerns for aspiration present given patient's s/sx of aspiration at beside, oral and pharyngeal weakness and hx of CVA w/ dysphagia. Patient would benefit from diet modifications, RD consult for nutritional supplements and MBS to further assess swallow function and r/o aspiration. Patient will benefit from skilled intervention to address the above impairments. Patients rehabilitation potential is considered to be Fair     PLAN :  Recommendations and Planned Interventions:  Puree diet, moderately thick liquids. Medications crushed. Assist w/ feeding. Aspiration precautions. RD consult for nutritional supplements. MBS to further assess swallow function   Frequency/Duration: Patient will be followed by speech-language pathology 5 times a week to address goals. Discharge Recommendations: To Be Determined     SUBJECTIVE:   Patient alert and seen at bedside. Caregiver present in room. Caregiver reports hx of dysphagia since CVA.  Consumes liquids and puree's. Will hold bolus in mouth, takes increased time. OBJECTIVE:     CXR Results  (Last 48 hours)                 10/23/22 1914  XR CHEST PORT Final result    Impression:  1. Left-sided mass is unchanged   2. Right basilar atelectasis       Narrative:  INDICATION:  CP        Exam: Portable chest 1911. Comparison: 10/7/2022. Findings: Cardiomediastinal silhouette is within normal limits. Pulmonary   vasculature is not engorged. Increasing atelectasis at the right lung base. Left   upper lobe mass and nodule at the left base unchanged. No pneumothorax or   definite effusion                   CT Results  (Last 48 hours)      None             Past Medical History:   Diagnosis Date    Cancer ([facility] [address]     Chronic kidney disease     Dialysis patient [facility])     Hypertension     Stroke [facility])    History reviewed. No pertinent surgical history. Prior Level of Function/Home Situation: needs assistance  Home Situation  Home Environment: Private residence  # Steps to Enter: 2  One/Two Story Residence: One story  Living Alone: No  Support Systems: Other Family Member(s)  Patient Expects to be Discharged to[de-identified] Home  Current DME Used/Available at Home: Zelphia Keisha, quad, Shower chair  Diet prior to admission: thin, puree  Current Diet:  NPO   Cognitive and Communication Status:  Neurologic State: Alert  Orientation Level: Oriented to person  Cognition: Decreased command following, Poor safety awareness           Swallowing Evaluation:   Oral Assessment:  Oral Assessment  Labial: Decreased rate;Decreased seal  Dentition: Limited  Oral Hygiene: FAIR  Lingual: Decreased rate;Decreased strength; Incoordinated  Velum: No impairment  P.O. Trials:  Patient Position: upright in bed  Vocal quality prior to P.O.: Low volume  Consistency Presented: Thin liquid;Puree;Pudding; Honey thick liquid  How Presented: Straw;Cup/sip; Self-fed/presented;SLP-fed/presented;Spoon; Successive swallows     Bolus Acceptance: No impairment  Bolus Formation/Control: Impaired  Type of Impairment: Anterior;Lip closure;Delayed;Spillage  Propulsion: Delayed (# of seconds)  Oral Residue: None  Initiation of Swallow: Delayed (# of seconds)  Laryngeal Elevation: Weak  Aspiration Signs/Symptoms: Weak cough  Pharyngeal Phase Characteristics: Poor endurance             Oral Phase Severity: Mild-moderate  Pharyngeal Phase Severity : Mild-moderate  Voice:                 Vocal Quality: Low volume               Pain:  Pain Scale 1: Numeric (0 - 10)  Pain Intensity 1: 0       After treatment:   Patient left in no apparent distress in bed, Nursing notified, and Caregiver / family present    COMMUNICATION/EDUCATION:   Patient was educated regarding purpose of SLP assessment, POC, diet recs and sw safety precautions. Patient demonstrated [address] understanding as evidenced by impaired cognition. The patient's plan of care including recommendations, planned interventions, and recommended diet changes were discussed with: Registered nurse. Patient/family have participated as able in goal setting and plan of care. Thank you for this referral.  Marsha Amaya, SLP [facility]  Time Calculation: 20 mins           Problem: Dysphagia (Adult)  Goal: *Acute Goals and Plan of Care (Insert Text)  Description: Speech Therapy Swallow Goals  Initiated 10/24/2022  -Patient will tolerate puree diet with honey thick liquids without clinical indicators of aspiration given moderate cues within 5-7day(s). -Patient will tolerate PO trials without clinical indicators of aspiration given moderate cues within 5-7day(s). -Patient will participate in modified barium swallow study within 5-7 day(s). -Patient will demonstrate understanding of swallow safety precautions and aspiration precautions, diet recs with moderate cues within 5-7 day(s).     -Patient/caregiver goal: \" to eat safely\"          Outcome: Not Progressing Towards Goal

## 2022-10-24 NOTE — PROGRESS NOTES
Problem: Falls - Risk of  Goal: *Absence of Falls  Description: Document Cynthia Griffith Fall Risk and appropriate interventions in the flowsheet.   Outcome: Progressing Towards Goal  Note: Fall Risk Interventions:  Mobility Interventions: Bed/chair exit alarm, Patient to call before getting OOB, PT Consult for mobility concerns, PT Consult for assist device competence, Utilize walker, cane, or other assistive device         Medication Interventions: Assess postural VS orthostatic hypotension, Patient to call before getting OOB, Teach patient to arise slowly    Elimination Interventions: Bed/chair exit alarm, Call light in reach, Patient to call for help with toileting needs, Toileting schedule/hourly rounds    History of Falls Interventions: Bed/chair exit alarm, Investigate reason for fall         Problem: Patient Education: Go to Patient Education Activity  Goal: Patient/Family Education  Outcome: Progressing Towards Goal

## 2022-10-24 NOTE — H&P
History and Physical    NAME: Ivan Romero   :  1950   MRN:  [id number]     Date/Time:  10/24/2022 6:14 PM    Patient PCP: Velia Carrillo MD  ______________________________________________________________________             Subjective:     CHIEF COMPLAINT:   Chest pain        HISTORY OF PRESENT ILLNESS:       Patient is a 81y.o. year old male with signal past medical history of end-stage renal disease on hemodialysis CVA hypertension metastatic lung cancer came to emergency room complaining of chest pain seen by the ER physician  Patient not a good historian but denies of any radiation no fever no chills or diaphoresis no palpitation generalized weakness with the seen by the ER physician and recommended patient to be admitted for acute chest pain    Past Medical History:   Diagnosis Date    Cancer [facility])     Chronic kidney disease     Dialysis patient [facility])     Hypertension     Stroke [facility])         History reviewed. No pertinent surgical history. Social History     Tobacco Use    Smoking status: Former     Packs/day: 1.00     Years: 40.00     Pack years: 40.00     Types: Cigarettes    Smokeless tobacco: Never   Substance Use Topics    Alcohol use: Not Currently        History reviewed. No pertinent family history. No Known Allergies     Prior to Admission medications    Medication Sig Start Date End Date Taking? Authorizing Provider   tamsulosin (FLOMAX) 0.4 mg capsule Take 0.4 mg by mouth two (2) times a day. Yes Provider, Historical   aspirin 81 mg chewable tablet Take 81 mg by mouth daily. Yes Provider, Historical   amLODIPine (NORVASC) 10 mg tablet Take 10 mg by mouth daily. Yes Provider, Historical   latanoprost (XALATAN) 0.005 % ophthalmic solution Administer 1 Drop to both eyes nightly. Yes Provider, Historical   sevelamer (RENAGEL) 400 mg tablet Take  by mouth three (3) times daily (with meals).    Yes Provider, Historical         Current Facility-Administered Medications:     aspirin chewable tablet 81 mg, 81 mg, Oral, DAILY, Salvador Kimbrough MD, 81 mg at 10/24/22 1550    atorvastatin (LIPITOR) tablet 40 mg, 40 mg, Oral, DAILY, Salvador Kimbrough MD, 40 mg at 10/24/22 1550    nitroglycerin (NITROBID) 2 % ointment 1 Inch, 1 Inch, Topical, Q6H PRN, Salvador Kimbrough MD    amLODIPine (NORVASC) tablet 10 mg, 10 mg, Oral, DAILY, Salvador Kimbrough MD, 10 mg at 10/24/22 1550    hydrALAZINE (APRESOLINE) tablet 50 mg, 50 mg, Oral, TID, Armando Kimbrough MD, 50 mg at 10/24/22 1550    heparin (porcine) injection 5,000 Units, 5,000 Units, SubCUTAneous, Q8H, Salvador Kimbrough MD, 5,000 Units at 10/24/22 1422    tamsulosin (FLOMAX) capsule 0.4 mg, 0.4 mg, Oral, BID, Salvador Kimbrough MD, 0.4 mg at 10/24/22 1550    latanoprost (XALATAN) 0.005 % ophthalmic solution 1 Drop, 1 Drop, Both Eyes, QPM, Salvador Kimbrough MD, 1 Drop at 10/24/22 1809    tiotropium-olodateroL ([address]) 2.5-2.5 mcg/actuation inhaler 2 Puff, 2 Puff, Inhalation, DAILY, Salvador Kimbrough MD    sevelamer carbonate (RENVELA) tab 800 mg, 800 mg, Oral, TID WITH MEALS, Salvador Kimbrough MD, 800 mg at 10/24/22 1710    LAB DATA REVIEWED:    Recent Results (from the past 24 hour(s))   EKG, 12 LEAD, INITIAL    Collection Time: 10/23/22  6:19 PM   Result Value Ref Range    Ventricular Rate 76 BPM    Atrial Rate 76 BPM    P-R Interval 150 ms    QRS Duration 84 ms    Q-T Interval 374 ms    QTC Calculation (Bezet) 420 ms    Calculated P Axis 74 degrees    Calculated R Axis 63 degrees    Calculated T Axis 83 degrees    Diagnosis       Normal sinus rhythm  Minimal voltage criteria for LVH, may be normal variant  Borderline ECG  When compared with ECG of 07-OCT-2022 10:54,  Premature ventricular complexes are no longer Present  Confirmed by RUTH CRUZ, Mary Waters ([id number]) on 10/23/2022 10:45:37 PM     EKG, 12 LEAD, INITIAL    Collection Time: 10/23/22  6:28 PM   Result Value Ref Range    Ventricular Rate 67 BPM    Atrial Rate 67 BPM    P-R Interval 154 ms    QRS Duration 94 ms    Q-T Interval 398 ms    QTC Calculation (Bezet) 420 ms    Calculated P Axis 67 degrees    Calculated R Axis 58 degrees    Calculated T Axis 72 degrees    Diagnosis       Normal sinus rhythm  Voltage criteria for left ventricular hypertrophy  Abnormal ECG  No previous ECGs available  Confirmed by Niyah Clay MD, [address] [place] [address] ([id number]) on 10/24/2022 5:29:02 PM     MAGNESIUM    Collection Time: 10/23/22  6:46 PM   Result Value Ref Range    Magnesium Hemolyzed, recollect requested 1.6 - 2.4 mg/dL   TROPONIN-HIGH SENSITIVITY    Collection Time: 10/23/22  6:46 PM   Result Value Ref Range    Troponin-High Sensitivity Hemolyzed, recollect requested 0 - 76 ng/L   METABOLIC PANEL, BASIC    Collection Time: 10/23/22  6:46 PM   Result Value Ref Range    Sodium 128 (L) 136 - 145 mmol/L    Potassium Hemolyzed, recollect requested 3.5 - 5.1 mmol/L    Chloride 99 97 - 108 mmol/L    CO2 26 21 - 32 mmol/L    Anion gap 3 (L) 5 - 15 mmol/L    Glucose 97 65 - 100 mg/dL    BUN 62 (H) 6 - 20 mg/dL    Creatinine 7.03 (H) 0.70 - 1.30 mg/dL    BUN/Creatinine ratio 9 (L) 12 - 20      eGFR 8 (L) >60 ml/min/1.73m2    Calcium 10.8 (H) 8.5 - 10.1 mg/dL   CBC WITH AUTOMATED DIFF    Collection Time: 10/23/22  6:46 PM   Result Value Ref Range    WBC 14.6 (H) 4.1 - 11.1 K/uL    RBC 4.28 4.10 - 5.70 M/uL    HGB 12.8 12.1 - 17.0 g/dL    HCT 39.9 36.6 - 50.3 %    MCV 93.2 80.0 - 99.0 FL    MCH 29.9 26.0 - 34.0 PG    MCHC 32.1 30.0 - 36.5 g/dL    RDW 16.6 (H) 11.5 - 14.5 %    PLATELET 267 245 - 179 K/uL    MPV 11.2 8.9 - 12.9 FL    NRBC 0.0 0.0  WBC    ABSOLUTE NRBC 0.00 0.00 - 0.01 K/uL    NEUTROPHILS 82 (H) 32 - 75 %    LYMPHOCYTES 10 (L) 12 - 49 %    MONOCYTES 8 5 - 13 %    EOSINOPHILS 0 0 - 7 %    BASOPHILS 0 0 - 1 %    IMMATURE GRANULOCYTES 0 0 - 0.5 %    ABS. NEUTROPHILS 11.9 (H) 1.8 - 8.0 K/UL    ABS. LYMPHOCYTES 1.4 0.8 - 3.5 K/UL    ABS. MONOCYTES 1.2 (H) 0.0 - 1.0 K/UL    ABS. EOSINOPHILS 0.0 0.0 - 0.4 K/UL    ABS.  BASOPHILS 0.0 0.0 - 0.1 K/UL    ABS. IMM. GRANS. 0.1 (H) 0.00 - 0.04 K/UL    DF AUTOMATED     POTASSIUM    Collection Time: 10/23/22  9:37 PM   Result Value Ref Range    Potassium 5.2 (H) 3.5 - 5.1 mmol/L   MAGNESIUM    Collection Time: 10/23/22  9:37 PM   Result Value Ref Range    Magnesium 2.7 (H) 1.6 - 2.4 mg/dL   TROPONIN-HIGH SENSITIVITY    Collection Time: 10/23/22  9:37 PM   Result Value Ref Range    Troponin-High Sensitivity 112 (H) 0 - 76 ng/L   TROPONIN-HIGH SENSITIVITY    Collection Time: 10/23/22 11:27 PM   Result Value Ref Range    Troponin-High Sensitivity 124 (HH) 0 - 76 ng/L   TROPONIN-HIGH SENSITIVITY    Collection Time: 10/24/22  2:00 AM   Result Value Ref Range    Troponin-High Sensitivity 121 (HH) 0 - 76 ng/L   CBC W/O DIFF    Collection Time: 10/24/22  7:21 AM   Result Value Ref Range    WBC 10.9 4.1 - 11.1 K/uL    RBC 3.98 (L) 4.10 - 5.70 M/uL    HGB 11.9 (L) 12.1 - 17.0 g/dL    HCT 36.6 36.6 - 50.3 %    MCV 92.0 80.0 - 99.0 FL    MCH 29.9 26.0 - 34.0 PG    MCHC 32.5 30.0 - 36.5 g/dL    RDW 16.3 (H) 11.5 - 14.5 %    PLATELET 808 563 - 332 K/uL    MPV 11.3 8.9 - 12.9 FL    NRBC 0.0 0.0  WBC    ABSOLUTE NRBC 0.00 0.00 - 5.44 K/uL   METABOLIC PANEL, BASIC    Collection Time: 10/24/22  7:21 AM   Result Value Ref Range    Sodium 136 136 - 145 mmol/L    Potassium 5.5 (H) 3.5 - 5.1 mmol/L    Chloride 102 97 - 108 mmol/L    CO2 26 21 - 32 mmol/L    Anion gap 8 5 - 15 mmol/L    Glucose 90 65 - 100 mg/dL    BUN 71 (H) 6 - 20 mg/dL    Creatinine 7.50 (H) 0.70 - 1.30 mg/dL    BUN/Creatinine ratio 9 (L) 12 - 20      eGFR 7 (L) >60 ml/min/1.73m2    Calcium 10.9 (H) 8.5 - 10.1 mg/dL   TROPONIN-HIGH SENSITIVITY    Collection Time: 10/24/22  7:21 AM   Result Value Ref Range    Troponin-High Sensitivity 114 (H) 0 - 76 ng/L       XR Results (most recent):  Results from Hospital Encounter encounter on 10/23/22    XR CHEST PORT    Narrative  INDICATION:  CP    Exam: Portable chest 1911.     Comparison: 10/7/2022. Findings: Cardiomediastinal silhouette is within normal limits. Pulmonary  vasculature is not engorged. Increasing atelectasis at the right lung base. Left  upper lobe mass and nodule at the left base unchanged. No pneumothorax or  definite effusion    Impression  1. Left-sided mass is unchanged  2. Right basilar atelectasis         XR CHEST PORT   Final Result   1. Left-sided mass is unchanged   2. Right basilar atelectasis      XR SWALLOW FUNC VIDEO    (Results Pending)        Review of Systems:  Constitutional: Negative for chills and fever. HENT: Negative. Eyes: Negative. Respiratory: Negative. Cardiovascular: Negative. Gastrointestinal: Negative for abdominal pain and nausea. Skin: Negative. Neurological: Negative. Objective:   VITALS:    Visit Vitals  BP (!) 137/91 (BP 1 Location: Left upper arm)   Pulse 77   Temp 98.1 °F (36.7 °C)   Resp 21   Ht 5' 9\" (1.753 m)   Wt 51.1 kg (112 lb 10.5 oz)   SpO2 98%   BMI 16.64 kg/m²       Physical Exam:   Constitutional: pt is oriented to person, place, and time. HENT:   Head: Normocephalic and atraumatic. Eyes: Pupils are equal, round, and reactive to light. EOM are normal.   Cardiovascular: Normal rate, regular rhythm and normal heart sounds. Pulmonary/Chest: Breath sounds normal. No wheezes. No rales. Exhibits no tenderness. Abdominal: Soft. Bowel sounds are normal. There is no abdominal tenderness. There is no rebound and no guarding. Musculoskeletal: Normal range of motion. Neurological: pt is alert and oriented to person, place, and time. Alert. Normal strength. No cranial nerve deficit or sensory deficit. Displays a negative Romberg sign.         ASSESSMENT & PLAN:    Chest pain rule out MI  Hypertension  Chronic kidney disease on hemodialysis  History of CVA  Metastatic lung disease      Patient is DNR  Continue amlodipine 10 mg daily aspirin 31 mg daily  Lipitor 40 mg daily hydralazine 50 mg 3 times a day Flomax 0.4 mg twice a day  Nephrology consult and cardiology consult      Current Facility-Administered Medications:     aspirin chewable tablet 81 mg, 81 mg, Oral, DAILY, Salvador Kimbrough MD, 81 mg at 10/24/22 1550    atorvastatin (LIPITOR) tablet 40 mg, 40 mg, Oral, DAILY, Salvador Kimbrough MD, 40 mg at 10/24/22 1550    nitroglycerin (NITROBID) 2 % ointment 1 Inch, 1 Inch, Topical, Q6H PRN, Salvador Kimbrough MD    amLODIPine (NORVASC) tablet 10 mg, 10 mg, Oral, DAILY, Salvador Kimbrough MD, 10 mg at 10/24/22 1550    hydrALAZINE (APRESOLINE) tablet 50 mg, 50 mg, Oral, TID, Isai Victoria MD, 50 mg at 10/24/22 1550    heparin (porcine) injection 5,000 Units, 5,000 Units, SubCUTAneous, Q8H, Salvador Kimbrough MD, 5,000 Units at 10/24/22 1422    tamsulosin (FLOMAX) capsule 0.4 mg, 0.4 mg, Oral, BID, Salvador Kimbrough MD, 0.4 mg at 10/24/22 1550    latanoprost (XALATAN) 0.005 % ophthalmic solution 1 Drop, 1 Drop, Both Eyes, QPM, Salvador Kimbrough MD, 1 Drop at 10/24/22 1809    tiotropium-olodateroL (STIOLTO RESPIMAT) 2.5-2.5 mcg/actuation inhaler 2 Puff, 2 Puff, Inhalation, DAILY, Salvador Kimbrough MD    sevelamer carbonate (RENVELA) tab 800 mg, 800 mg, Oral, TID WITH MEALS, Salvador Kimbrough MD, 800 mg at 10/24/22 1710       Discussed with the patient's sister POA data about lung metastatic cancer and have seen oncologist no further treatment planned    If patient remains stable possible discharge home tomorrow          ________________________________________________________________________    Signed: Irma Merchant MD

## 2022-10-24 NOTE — ED NOTES
Informed Dr. Mary Jo Norris that pt's second trop came back elevated at 124.  A third trop had been ordered for 23:30 but Dr. North Moser gave orders to draw that 1:30 instead

## 2022-10-24 NOTE — CONSULTS
NAME:  Marleen Barton   :   1950   MRN:   [id number]     ATTENDING: Jalen Ontiveros MD  PCP:  Lamonte Byrd MD    Date/Time:  10/24/2022 1:36 PM      Subjective:   REQUESTING PHYSICIAN:Salvador Kimbrough MD  REASON FOR CONSULT:   ESRD on HD     History of presenting illness: Patient is a 79-year-old -American male with past medical history of end-stage renal disease on hemodialysis, CVA, hypertension, history of of metastatic lung cancer presented to the emergency room with complaints of chest pain. Nephrology consultation is requested for management of end-stage renal disease and hemodialysis. Patient undergoes hemodialysis MWF at [address] [facility] renal care [facility] dialysis unit. Inpatient hemodialysis arranged for today and patient seen on hemodialysis. He is alert awake, understanding some conversation and follows verbal commands, minimal verbal responses, confused and disoriented. Patient has history of dementia with past CVA. Patient has significant weight loss recently related to metastatic lung cancer. Patient was recently seen by oncologist, family is contemplating hospice care. Past Medical History:   Diagnosis Date    Cancer [facility])     Chronic kidney disease     Dialysis patient [facility])     Hypertension     Stroke [facility])       History reviewed. No pertinent surgical history. Social History     Tobacco Use    Smoking status: Former     Packs/day: 1.00     Years: 40.00     Pack years: 40.00     Types: Cigarettes    Smokeless tobacco: Never   Substance Use Topics    Alcohol use: Not Currently      History reviewed. No pertinent family history. No Known Allergies   Prior to Admission medications    Medication Sig Start Date End Date Taking? Authorizing Provider   tamsulosin (FLOMAX) 0.4 mg capsule Take 0.4 mg by mouth two (2) times a day. Yes Provider, Historical   aspirin 81 mg chewable tablet Take 81 mg by mouth daily.    Yes Provider, Historical   amLODIPine (NORVASC) 10 mg tablet Take 10 mg by mouth daily. Yes Provider, Historical   latanoprost (XALATAN) 0.005 % ophthalmic solution Administer 1 Drop to both eyes nightly. Yes Provider, Historical   sevelamer (RENAGEL) 400 mg tablet Take  by mouth three (3) times daily (with meals). Yes Provider, Historical     Current Facility-Administered Medications   Medication Dose Route Frequency    aspirin chewable tablet 81 mg  81 mg Oral DAILY    atorvastatin (LIPITOR) tablet 40 mg  40 mg Oral DAILY    nitroglycerin (NITROBID) 2 % ointment 1 Inch  1 Inch Topical Q6H PRN    amLODIPine (NORVASC) tablet 10 mg  10 mg Oral DAILY    hydrALAZINE (APRESOLINE) tablet 50 mg  50 mg Oral TID    heparin (porcine) injection 5,000 Units  5,000 Units SubCUTAneous Q8H    tamsulosin (FLOMAX) capsule 0.4 mg  0.4 mg Oral BID    latanoprost (XALATAN) 0.005 % ophthalmic solution 1 Drop  1 Drop Both Eyes QPM    tiotropium-olodateroL (STIOLTO RESPIMAT) 2.5-2.5 mcg/actuation inhaler 2 Puff  2 Puff Inhalation DAILY    sevelamer carbonate (RENVELA) tab 800 mg  800 mg Oral TID WITH MEALS    sodium polystyrene (KAYEXALATE) 15 gram/60 mL oral suspension 30 g  30 g Oral NOW       REVIEW OF SYSTEMS:     Complaints as mentioned in the history of presenting illness. Patient is emaciated, altered mental status with dementia, failure to thrive  Poor historian and unable to review systems    Objective:   VITALS:    Visit Vitals  /75   Pulse 83   Temp (P) 98.2 °F (36.8 °C) (Oral)   Resp (P) 18   Ht 5' 9\" (1.753 m)   Wt 51.1 kg (112 lb 10.5 oz)   SpO2 98%   BMI 16.64 kg/m²     Temp (24hrs), Av.3 °F (36.8 °C), Min:97.6 °F (36.4 °C), Max:98.8 °F (37.1 °C)      PHYSICAL EXAM:   General: alert awake AMS, no acute distress. HEENT: EOMI, no Icterus, no Pallor, pupils reactive, mucosa dry normal inspection of ears and nose,   Neck: Neck is supple, No JVD, no thyromegaly. Lungs: good air entry+ no rhonchi, no rales,  CVS: heart sounds normal,  no murmurs, no rubs.   GI: soft, nontender, normal BS,   Extremeties: no clubbing, no cyanosis, no edema,  Neuro: Alert, awake, altered mental status, residual weakness from old CVA   Skin: normal skin turgor, no skin rashes .   Musculoskeletal: no acute joint swellings    LAB DATA REVIEWED:    Recent Results (from the past 24 hour(s))   EKG, 12 LEAD, INITIAL    Collection Time: 10/23/22  6:19 PM   Result Value Ref Range    Ventricular Rate 76 BPM    Atrial Rate 76 BPM    P-R Interval 150 ms    QRS Duration 84 ms    Q-T Interval 374 ms    QTC Calculation (Bezet) 420 ms    Calculated P Axis 74 degrees    Calculated R Axis 63 degrees    Calculated T Axis 83 degrees    Diagnosis       Normal sinus rhythm  Minimal voltage criteria for LVH, may be normal variant  Borderline ECG  When compared with ECG of 07-OCT-2022 10:54,  Premature ventricular complexes are no longer Present  Confirmed by RUTH CRUZ, Canelo Comptche ([id number]) on 10/23/2022 10:45:37 PM     MAGNESIUM    Collection Time: 10/23/22  6:46 PM   Result Value Ref Range    Magnesium Hemolyzed, recollect requested 1.6 - 2.4 mg/dL   TROPONIN-HIGH SENSITIVITY    Collection Time: 10/23/22  6:46 PM   Result Value Ref Range    Troponin-High Sensitivity Hemolyzed, recollect requested 0 - 76 ng/L   METABOLIC PANEL, BASIC    Collection Time: 10/23/22  6:46 PM   Result Value Ref Range    Sodium 128 (L) 136 - 145 mmol/L    Potassium Hemolyzed, recollect requested 3.5 - 5.1 mmol/L    Chloride 99 97 - 108 mmol/L    CO2 26 21 - 32 mmol/L    Anion gap 3 (L) 5 - 15 mmol/L    Glucose 97 65 - 100 mg/dL    BUN 62 (H) 6 - 20 mg/dL    Creatinine 7.03 (H) 0.70 - 1.30 mg/dL    BUN/Creatinine ratio 9 (L) 12 - 20      eGFR 8 (L) >60 ml/min/1.73m2    Calcium 10.8 (H) 8.5 - 10.1 mg/dL   CBC WITH AUTOMATED DIFF    Collection Time: 10/23/22  6:46 PM   Result Value Ref Range    WBC 14.6 (H) 4.1 - 11.1 K/uL    RBC 4.28 4.10 - 5.70 M/uL    HGB 12.8 12.1 - 17.0 g/dL    HCT 39.9 36.6 - 50.3 %    MCV 93.2 80.0 - 99.0 FL    MCH 29.9 26.0 - 34.0 PG MCHC 32.1 30.0 - 36.5 g/dL    RDW 16.6 (H) 11.5 - 14.5 %    PLATELET 103 316 - 950 K/uL    MPV 11.2 8.9 - 12.9 FL    NRBC 0.0 0.0  WBC    ABSOLUTE NRBC 0.00 0.00 - 0.01 K/uL    NEUTROPHILS 82 (H) 32 - 75 %    LYMPHOCYTES 10 (L) 12 - 49 %    MONOCYTES 8 5 - 13 %    EOSINOPHILS 0 0 - 7 %    BASOPHILS 0 0 - 1 %    IMMATURE GRANULOCYTES 0 0 - 0.5 %    ABS. NEUTROPHILS 11.9 (H) 1.8 - 8.0 K/UL    ABS. LYMPHOCYTES 1.4 0.8 - 3.5 K/UL    ABS. MONOCYTES 1.2 (H) 0.0 - 1.0 K/UL    ABS. EOSINOPHILS 0.0 0.0 - 0.4 K/UL    ABS. BASOPHILS 0.0 0.0 - 0.1 K/UL    ABS. IMM.  GRANS. 0.1 (H) 0.00 - 0.04 K/UL    DF AUTOMATED     POTASSIUM    Collection Time: 10/23/22  9:37 PM   Result Value Ref Range    Potassium 5.2 (H) 3.5 - 5.1 mmol/L   MAGNESIUM    Collection Time: 10/23/22  9:37 PM   Result Value Ref Range    Magnesium 2.7 (H) 1.6 - 2.4 mg/dL   TROPONIN-HIGH SENSITIVITY    Collection Time: 10/23/22  9:37 PM   Result Value Ref Range    Troponin-High Sensitivity 112 (H) 0 - 76 ng/L   TROPONIN-HIGH SENSITIVITY    Collection Time: 10/23/22 11:27 PM   Result Value Ref Range    Troponin-High Sensitivity 124 (HH) 0 - 76 ng/L   TROPONIN-HIGH SENSITIVITY    Collection Time: 10/24/22  2:00 AM   Result Value Ref Range    Troponin-High Sensitivity 121 (HH) 0 - 76 ng/L   CBC W/O DIFF    Collection Time: 10/24/22  7:21 AM   Result Value Ref Range    WBC 10.9 4.1 - 11.1 K/uL    RBC 3.98 (L) 4.10 - 5.70 M/uL    HGB 11.9 (L) 12.1 - 17.0 g/dL    HCT 36.6 36.6 - 50.3 %    MCV 92.0 80.0 - 99.0 FL    MCH 29.9 26.0 - 34.0 PG    MCHC 32.5 30.0 - 36.5 g/dL    RDW 16.3 (H) 11.5 - 14.5 %    PLATELET 449 581 - 046 K/uL    MPV 11.3 8.9 - 12.9 FL    NRBC 0.0 0.0  WBC    ABSOLUTE NRBC 0.00 0.00 - 5.34 K/uL   METABOLIC PANEL, BASIC    Collection Time: 10/24/22  7:21 AM   Result Value Ref Range    Sodium 136 136 - 145 mmol/L    Potassium 5.5 (H) 3.5 - 5.1 mmol/L    Chloride 102 97 - 108 mmol/L    CO2 26 21 - 32 mmol/L    Anion gap 8 5 - 15 mmol/L Glucose 90 65 - 100 mg/dL    BUN 71 (H) 6 - 20 mg/dL    Creatinine 7.50 (H) 0.70 - 1.30 mg/dL    BUN/Creatinine ratio 9 (L) 12 - 20      eGFR 7 (L) >60 ml/min/1.73m2    Calcium 10.9 (H) 8.5 - 10.1 mg/dL   TROPONIN-HIGH SENSITIVITY    Collection Time: 10/24/22  7:21 AM   Result Value Ref Range    Troponin-High Sensitivity 114 (H) 0 - 76 ng/L       Assessment and plan:      End-stage renal disease on hemodialysis: on MWF at [facility]  No complaints of any fluid overload,   Inpatient hemodialysis arranged for today, patient seen on hemodialysis, tolerating well . Will continue maintenance hemodialysis on Monday Wednesdays and Fridays     2. Hypertension: Stable  Continue current blood pressure medications and continue to monitor blood pressures    3. chest pain: Nonspecific, patient unable to explain well  May be related to lung lesions/large lung mass  Borderline troponin elevations+    4. Hyperkalemia: Secondary to end-stage renal disease  Expect to resolve with hemodialysis today    5, Anemia: Stable hemoglobin at 11.9  Continue to monitor H&H    6. Renal osteodystrophy: High calcium level, will repeat calcium tomorrow, if persistently high will use low calcium bath with hemodialysis    7. Hyperphosphatemia: continue phosphate binders  We will check phosphorus level and adjust binders as needed  Secondary hyperparathyroidism: Continue Zemplar/Sensipar as per outpatient protocol    8. Metastatic lung cancer: Sister Yvonne Ascencio is his legal guardian, she is aware of his advanced malignancy and awaiting to make a decision on comfort care.           Signed: Jackline Skiff, MD

## 2022-10-24 NOTE — ROUTINE PROCESS
Patient's home medications were placed after performing the med rec over the phone with the patient's legal guardian Paolo Fernández and taking telephone orders with readback from Dr. Richard Avitia.  also ordered DNR status based on the advance directive provided by the patient's legal guardian that was scanned into the medical record on 10/23/2022. As per nursing supervisor this is out of my scope of practice and I am unable to place the DNR order. Will defer to Dr. Richard Avitia.

## 2022-10-24 NOTE — PROGRESS NOTES
Consult for ST received. Patient currently off the floor for dialysis. Will follow-up at a later time if time permits and patient is available.

## 2022-10-25 PROBLEM — E43 SEVERE PROTEIN-CALORIE MALNUTRITION (HCC): Status: ACTIVE | Noted: 2022-10-25

## 2022-10-25 LAB
ALBUMIN SERPL-MCNC: 3 G/DL (ref 3.5–5)
ANION GAP SERPL CALC-SCNC: 12 MMOL/L (ref 5–15)
ATRIAL RATE: 97 BPM
BUN SERPL-MCNC: 61 MG/DL (ref 6–20)
BUN/CREAT SERPL: 10 (ref 12–20)
CA-I BLD-MCNC: 10.5 MG/DL (ref 8.5–10.1)
CALCULATED P AXIS, ECG09: 55 DEGREES
CALCULATED R AXIS, ECG10: 67 DEGREES
CALCULATED T AXIS, ECG11: 136 DEGREES
CHLORIDE SERPL-SCNC: 99 MMOL/L (ref 97–108)
CO2 SERPL-SCNC: 25 MMOL/L (ref 21–32)
CREAT SERPL-MCNC: 5.85 MG/DL (ref 0.7–1.3)
DIAGNOSIS, 93000: NORMAL
GLUCOSE SERPL-MCNC: 108 MG/DL (ref 65–100)
P-R INTERVAL, ECG05: 148 MS
PHOSPHATE SERPL-MCNC: 5.6 MG/DL (ref 2.6–4.7)
POTASSIUM SERPL-SCNC: 4.8 MMOL/L (ref 3.5–5.1)
Q-T INTERVAL, ECG07: 332 MS
QRS DURATION, ECG06: 88 MS
QTC CALCULATION (BEZET), ECG08: 421 MS
SODIUM SERPL-SCNC: 136 MMOL/L (ref 136–145)
VENTRICULAR RATE, ECG03: 97 BPM

## 2022-10-25 PROCEDURE — 74011250636 HC RX REV CODE- 250/636: Performed by: FAMILY MEDICINE

## 2022-10-25 PROCEDURE — 65270000029 HC RM PRIVATE

## 2022-10-25 PROCEDURE — 74011250637 HC RX REV CODE- 250/637: Performed by: FAMILY MEDICINE

## 2022-10-25 PROCEDURE — 80069 RENAL FUNCTION PANEL: CPT

## 2022-10-25 PROCEDURE — 94640 AIRWAY INHALATION TREATMENT: CPT

## 2022-10-25 PROCEDURE — 74011250637 HC RX REV CODE- 250/637: Performed by: INTERNAL MEDICINE

## 2022-10-25 PROCEDURE — 36415 COLL VENOUS BLD VENIPUNCTURE: CPT

## 2022-10-25 PROCEDURE — 93005 ELECTROCARDIOGRAM TRACING: CPT

## 2022-10-25 PROCEDURE — 92526 ORAL FUNCTION THERAPY: CPT

## 2022-10-25 RX ORDER — DILTIAZEM HYDROCHLORIDE 120 MG/1
240 CAPSULE, COATED, EXTENDED RELEASE ORAL DAILY
Status: DISCONTINUED | OUTPATIENT
Start: 2022-10-26 | End: 2022-10-27 | Stop reason: HOSPADM

## 2022-10-25 RX ORDER — NITROGLYCERIN 0.4 MG/1
0.4 TABLET SUBLINGUAL AS NEEDED
Status: DISCONTINUED | OUTPATIENT
Start: 2022-10-25 | End: 2022-10-27 | Stop reason: HOSPADM

## 2022-10-25 RX ORDER — METOPROLOL SUCCINATE 50 MG/1
50 TABLET, EXTENDED RELEASE ORAL DAILY
Status: DISCONTINUED | OUTPATIENT
Start: 2022-10-25 | End: 2022-10-27 | Stop reason: HOSPADM

## 2022-10-25 RX ADMIN — SEVELAMER CARBONATE 800 MG: 800 TABLET, FILM COATED ORAL at 16:54

## 2022-10-25 RX ADMIN — LATANOPROST 1 DROP: 50 SOLUTION OPHTHALMIC at 17:01

## 2022-10-25 RX ADMIN — SEVELAMER CARBONATE 800 MG: 800 TABLET, FILM COATED ORAL at 08:22

## 2022-10-25 RX ADMIN — ATORVASTATIN CALCIUM 40 MG: 40 TABLET, FILM COATED ORAL at 09:05

## 2022-10-25 RX ADMIN — SEVELAMER CARBONATE 800 MG: 800 TABLET, FILM COATED ORAL at 12:44

## 2022-10-25 RX ADMIN — TAMSULOSIN HYDROCHLORIDE 0.4 MG: 0.4 CAPSULE ORAL at 09:05

## 2022-10-25 RX ADMIN — HYDRALAZINE HYDROCHLORIDE 50 MG: 50 TABLET, FILM COATED ORAL at 21:16

## 2022-10-25 RX ADMIN — TAMSULOSIN HYDROCHLORIDE 0.4 MG: 0.4 CAPSULE ORAL at 21:16

## 2022-10-25 RX ADMIN — TIOTROPIUM BROMIDE AND OLODATEROL 2 PUFF: 3.124; 2.736 SPRAY, METERED RESPIRATORY (INHALATION) at 08:32

## 2022-10-25 RX ADMIN — HYDRALAZINE HYDROCHLORIDE 50 MG: 50 TABLET, FILM COATED ORAL at 09:05

## 2022-10-25 RX ADMIN — ASPIRIN 81 MG CHEWABLE TABLET 81 MG: 81 TABLET CHEWABLE at 09:05

## 2022-10-25 RX ADMIN — AMLODIPINE BESYLATE 10 MG: 5 TABLET ORAL at 09:05

## 2022-10-25 RX ADMIN — HYDRALAZINE HYDROCHLORIDE 50 MG: 50 TABLET, FILM COATED ORAL at 16:54

## 2022-10-25 RX ADMIN — HEPARIN SODIUM 5000 UNITS: 5000 INJECTION INTRAVENOUS; SUBCUTANEOUS at 21:16

## 2022-10-25 RX ADMIN — HEPARIN SODIUM 5000 UNITS: 5000 INJECTION INTRAVENOUS; SUBCUTANEOUS at 05:06

## 2022-10-25 RX ADMIN — HEPARIN SODIUM 5000 UNITS: 5000 INJECTION INTRAVENOUS; SUBCUTANEOUS at 13:00

## 2022-10-25 RX ADMIN — METOPROLOL SUCCINATE 50 MG: 50 TABLET, EXTENDED RELEASE ORAL at 16:54

## 2022-10-25 NOTE — CONSULTS
Cardiology Consult    NAME: Cary Hill   :  1950   MRN:  [id number]     Date/Time:  10/25/2022 4:03 PM    Patient PCP: Alisha Chinchilla MD  ________________________________________________________________________     Assessment/Plan:   Chest pain, currently pain-free, mildl flat troponin leak. Await echo. Continue medical management. Tachycardia, will add beta-blockade and switch amlodipine to Cardizem. Hypertension, add beta-blockade and change amlodipine to Cardizem. Chronic kidney disease, continues hemodialysis. Metastatic lung cancer, COPD, tobacco use. Dementia. DNR, for home hospice           []        High complexity decision making was performed        Subjective:   CHIEF COMPLAINT:   Chest pain    REASON FOR CONSULT:  Chest pain    HISTORY OF PRESENT ILLNESS:     Cary Hill is a 81 y.o. BLACK/ male who has a history of hypertension, tobacco use, metastatic lung cancer and kidney disease presenting  with complaints of chest pain. He has mildly elevated troponin, without peaking. EKG unremarkable. Found to develop episodes of tachycardia while moving around. Heart rate jumped to 190s, now down to the 90s, APCs with aberrant conduction. Will discontinue amlodipine, start beta-blockade and calcium channel blocker with Cardizem. Hypercholesterolemia, on  atorvastatin. May discontinue. End-stage renal disease, on hemodialysis. Patient is unable to give much history, old chart and patient's sister also source of information. Past Medical History:   Diagnosis Date    Cancer [facility])     Chronic kidney disease     Dialysis patient [facility])     Hypertension     Stroke [facility])       History reviewed. No pertinent surgical history.   No Known Allergies   Meds:  See below  Social History     Tobacco Use    Smoking status: Former     Packs/day: 1.00     Years: 40.00     Pack years: 40.00     Types: Cigarettes    Smokeless tobacco: Never   Substance Use Topics    Alcohol use: Not Currently      History reviewed. No pertinent family history. REVIEW OF SYSTEMS:     [x]         Unable to obtain  ROS due to ---   []         Total of 12 systems reviewed as follows:    Constitutional: negative fever, negative chills, negative weight loss  Eyes:   negative visual changes  ENT:   negative sore throat, tongue or lip swelling  Respiratory:  negative cough, negative dyspnea  Cards:  negative for chest pain, palpitations, lower extremity edema  GI:   negative for nausea, vomiting, diarrhea, and abdominal pain  Genitourinary: negative for frequency, dysuria  Integument:  negative for rash   Hematologic:  negative for easy bruising and gum/nose bleeding  Musculoskel: negative for myalgias,  back pain  Neurological:  negative for headaches, dizziness, vertigo, weakness  Behavl/Psych: negative for feelings of anxiety, depression     Pertinent Positives include :    Objective:      Physical Exam:    Last 24hrs VS reviewed since prior progress note. Most recent are:    Visit Vitals  BP (!) 161/92 (BP 1 Location: Right upper arm, BP Patient Position: Lying left side)   Pulse 77   Temp 97.8 °F (36.6 °C)   Resp 16   Ht 5' 9\" (1.753 m)   Wt 50.2 kg (110 lb 10.7 oz)   SpO2 98%   BMI 16.34 kg/m²     No intake or output data in the 24 hours ending 10/25/22 1603     General Appearance: Well developed, alert, no acute distress. Ears/Nose/Mouth/Throat: Pupils equal and round, Hearing grossly normal.  Neck: Supple. JVP within normal limits. Carotids good upstrokes, with no bruit. Chest: Lungs clear to auscultation bilaterally. Cardiovascular: Regular rate and rhythm, S1S2 normal, no murmur, rubs, gallops. Abdomen: Soft, non-tender, bowel sounds are active. No organomegaly. Extremities: No edema bilaterally. Femoral pulses +2, Distal Pulses +1. Skin: Warm and dry. Neuro: Alert, follows simple commands  Psychiatric: Cooperative.     []         Post-cath site without hematoma, bruit, tenderness, or thrill. Distal pulses intact. Data:      Telemetry:    EKG:  []  No new EKG for review. 7/22 CT chest with metastatic lung cancer  EKG with sinus rhythm  Telemetry with SVT  Chest x-ray with left-sided mass, right basilar atelectasis. Prior to Admission medications    Medication Sig Start Date End Date Taking? Authorizing Provider   tamsulosin (FLOMAX) 0.4 mg capsule Take 0.4 mg by mouth two (2) times a day. Yes Provider, Historical   aspirin 81 mg chewable tablet Take 81 mg by mouth daily. Yes Provider, Historical   amLODIPine (NORVASC) 10 mg tablet Take 10 mg by mouth daily. Yes Provider, Historical   latanoprost (XALATAN) 0.005 % ophthalmic solution Administer 1 Drop to both eyes nightly. Yes Provider, Historical   sevelamer (RENAGEL) 400 mg tablet Take  by mouth three (3) times daily (with meals).    Yes Provider, Historical       Recent Results (from the past 24 hour(s))   RENAL FUNCTION PANEL    Collection Time: 10/25/22 11:13 AM   Result Value Ref Range    Sodium 136 136 - 145 mmol/L    Potassium 4.8 3.5 - 5.1 mmol/L    Chloride 99 97 - 108 mmol/L    CO2 25 21 - 32 mmol/L    Anion gap 12 5 - 15 mmol/L    Glucose 108 (H) 65 - 100 mg/dL    BUN 61 (H) 6 - 20 mg/dL    Creatinine 5.85 (H) 0.70 - 1.30 mg/dL    BUN/Creatinine ratio 10 (L) 12 - 20      eGFR 10 (L) >60 ml/min/1.73m2    Calcium 10.5 (H) 8.5 - 10.1 mg/dL    Phosphorus 5.6 (H) 2.6 - 4.7 mg/dL    Albumin 3.0 (L) 3.5 - 5.0 g/dL        Milagro De La Rosa MD

## 2022-10-25 NOTE — PROGRESS NOTES
Comprehensive Nutrition Assessment    Type and Reason for Visit: Initial, Positive nutrition screen (MST 3)    Nutrition Recommendations/Plan:   Continue current dysphagia diet  Add Ensure pudding x2/d (340kcals, 8g pro)  Continue to monitor and document PO intake and Bms in I/Os     Malnutrition Assessment:  Malnutrition Status:  Severe malnutrition (10/25/22 1159)    Context:  Chronic illness     Findings of the 6 clinical characteristics of malnutrition:   Energy Intake:  75% or less est energy requirements for 1 month or longer  Weight Loss:  5% over 1 month (8.3%)     Body Fat Loss:   , Triceps, Orbital, Buccal region   Muscle Mass Loss:  Severe muscle mass loss, Clavicles (pectoralis &deltoids), Temples (temporalis), Scapula (trapezius)  Fluid Accumulation:  No significant fluid accumulation,     Strength:  Not performed     Nutrition Assessment:    Pt admitted for chest pain, remains inpt for pain mgmt. Per pt, decr appetite and intakes with 5# wt loss x1mo pta. Per EMR, 10# wt loss x3wks (8.3%, significant). Pt reports eating <25% bfast this AM, RD add ONS for nutr dx. Ensure pt is assisted during mealtimes. Labs: K 5.5, BUN 71, creat 7.5, hgb 11.9, mg 2.7. Meds: Amlodipine, lipitor, heparin, apresoline. Nutrition Related Findings:    Per NFPE, muscle and fat loss noted. No N/V/D, ?C, unknown last BM per pt. Dysphagia per SLP, pureed with mod thick liquids. No edema. Wound Type: None    Current Nutrition Intake & Therapies:  Average Meal Intake: 1-25%  Average Supplement Intake: None ordered  ADULT DIET Dysphagia - Pureed; Moderately Thick (Honey)    Anthropometric Measures:  Height: 5' 9\" (175.3 cm)  Ideal Body Weight (IBW): 160 lbs (73 kg)     Current Body Wt:  59.2 kg (130 lb 8.2 oz), 81.6 % IBW.  Bed scale  Current BMI (kg/m2): 19.3     Weight Adjustment: No adjustment     BMI Category: Underweight (BMI less than 22) age over 72    Estimated Daily Nutrient Needs:  Energy Requirements Based On: Kcal/kg  Weight Used for Energy Requirements: Current  Energy (kcal/day): 1776-2072kcals/day (30-35kcals/kg, CA, severe)  Weight Used for Protein Requirements: Current  Protein (g/day): 71-88g/day (1.2-1.5g/kg, HD, CA)  Method Used for Fluid Requirements: Standard renal  Fluid (ml/day): 1500mL    Nutrition Diagnosis:   Severe malnutrition, In context of chronic illness related to catabolic illness as evidenced by severe loss of subcutaneous fat, severe muscle loss, weight loss greater than or equal to 5% in 1 month (8.3%x1 mo)    Nutrition Interventions:   Food and/or Nutrient Delivery: Continue current diet, Start oral nutrition supplement  Nutrition Education/Counseling: No recommendations at this time  Coordination of Nutrition Care: Continue to monitor while inpatient, Feeding assistance/environmental change  Plan of Care discussed with: Pt and sister    Goals:     Goals: PO intake 50% or greater, by next RD assessment     Nutrition Monitoring and Evaluation:   Behavioral-Environmental Outcomes: None identified  Food/Nutrient Intake Outcomes: Food and nutrient intake, Diet advancement/tolerance, Supplement intake  Physical Signs/Symptoms Outcomes: Chewing or swallowing, Nutrition focused physical findings, Skin, Weight, Meal time behavior, Constipation    Discharge Planning:    Continue oral nutrition supplement, Continue current diet    Gilford Linger  Contact: [phone]

## 2022-10-25 NOTE — PROGRESS NOTES
History and Physical    NAME: Corey Lindsey   :  1950   MRN:  [id number]     Date/Time:  10/25/2022 6:14 PM    Patient PCP: Richard Figueroa MD  ______________________________________________________________________             Subjective:     CHIEF COMPLAINT:   Chest pain        HISTORY OF PRESENT ILLNESS:       Patient is a 81y.o. year old male with signal past medical history of end-stage renal disease on hemodialysis CVA hypertension metastatic lung cancer came to emergency room complaining of chest pain seen by the ER physician  Patient not a good historian but denies of any radiation no fever no chills or diaphoresis no palpitation generalized weakness with the seen by the ER physician and recommended patient to be admitted for acute chest pain    10/25  Pt is alert and awake, resting comfortably in bed. He responds minimally to questions. Pt is a poor historian. Denies chest pain, dyspnea, fever, chills, n/v, changes in bladder or bowel habits. Seen by nephrology yesterday. Pt has ESRD on hemodialysis MWF at [facility]. No fluid overload complaints. XR Chest from 10/23  Left-sided mass is unchanged  Right basilar atelectasis    CT Chest WO Cont from 10/07  Previously noted possible mass in left upper lobe orresponds to a 3.8 x 4.1 cm mass. There are multiple other nodular masses. Findings are suggestive of metastatic disease. The left upper lobe mass may represent a primary or metastasis as well. Multiple liver masses are noted with perihepatic ascites. Large mass infiltrating most of the right hepatic lobe. Questionable biliary dilatation. Further assessment is limited. Past Medical History:   Diagnosis Date    Cancer [facility])     Chronic kidney disease     Dialysis patient [facility])     Hypertension     Stroke [facility])         History reviewed. No pertinent surgical history.     Social History     Tobacco Use    Smoking status: Former     Packs/day: 1.00     Years: 40.00     Pack years: 40.00 Types: Cigarettes    Smokeless tobacco: Never   Substance Use Topics    Alcohol use: Not Currently        History reviewed. No pertinent family history. No Known Allergies     Prior to Admission medications    Medication Sig Start Date End Date Taking? Authorizing Provider   tamsulosin (FLOMAX) 0.4 mg capsule Take 0.4 mg by mouth two (2) times a day. Yes Provider, Historical   aspirin 81 mg chewable tablet Take 81 mg by mouth daily. Yes Provider, Historical   amLODIPine (NORVASC) 10 mg tablet Take 10 mg by mouth daily. Yes Provider, Historical   latanoprost (XALATAN) 0.005 % ophthalmic solution Administer 1 Drop to both eyes nightly. Yes Provider, Historical   sevelamer (RENAGEL) 400 mg tablet Take  by mouth three (3) times daily (with meals).    Yes Provider, Historical         Current Facility-Administered Medications:     aspirin chewable tablet 81 mg, 81 mg, Oral, DAILY, Salvador Kimbrough MD, 81 mg at 10/25/22 0905    atorvastatin (LIPITOR) tablet 40 mg, 40 mg, Oral, DAILY, Salvador Kimbrough MD, 40 mg at 10/25/22 9980    nitroglycerin (NITROBID) 2 % ointment 1 Inch, 1 Inch, Topical, Q6H PRN, Salvador Kimbrough MD    amLODIPine (NORVASC) tablet 10 mg, 10 mg, Oral, DAILY, Salvador Kimbrough MD, 10 mg at 10/25/22 6518    hydrALAZINE (APRESOLINE) tablet 50 mg, 50 mg, Oral, TID, Salvador Kimbrough MD, 50 mg at 10/25/22 0905    heparin (porcine) injection 5,000 Units, 5,000 Units, SubCUTAneous, Q8H, Salvador Kimbrough MD, 5,000 Units at 10/25/22 0506    tamsulosin (FLOMAX) capsule 0.4 mg, 0.4 mg, Oral, BID, Salvador Kimbrough MD, 0.4 mg at 10/25/22 0905    latanoprost (XALATAN) 0.005 % ophthalmic solution 1 Drop, 1 Drop, Both Eyes, QPM, Salvador Kimbrough MD, 1 Drop at 10/24/22 1809    tiotropium-olodateroL (STIOLTO RESPIMAT) 2.5-2.5 mcg/actuation inhaler 2 Puff, 2 Puff, Inhalation, DAILY, Salvador Kimbrough MD, 2 Puff at 10/25/22 3059    sevelamer carbonate (RENVELA) tab 800 mg, 800 mg, Oral, TID WITH MEALS, Jp Grant MD, 800 mg at 10/25/22 9344    LAB DATA REVIEWED:    No results found for this or any previous visit (from the past 24 hour(s)). XR Results (most recent):  Results from Hospital Encounter encounter on 10/23/22    XR CHEST PORT    Narrative  INDICATION:  CP    Exam: Portable chest 1911. Comparison: 10/7/2022. Findings: Cardiomediastinal silhouette is within normal limits. Pulmonary  vasculature is not engorged. Increasing atelectasis at the right lung base. Left  upper lobe mass and nodule at the left base unchanged. No pneumothorax or  definite effusion    Impression  1. Left-sided mass is unchanged  2. Right basilar atelectasis         XR CHEST PORT   Final Result   1. Left-sided mass is unchanged   2. Right basilar atelectasis      XR SWALLOW FUNC VIDEO    (Results Pending)        Review of Systems:  Constitutional: Negative for chills and fever. HENT: Negative. Eyes: Negative. Respiratory: Negative. Cardiovascular: Negative. Gastrointestinal: Negative for abdominal pain and nausea. Skin: Negative. Neurological: Negative. Objective:   VITALS:    Visit Vitals  BP (!) 145/89 (BP 1 Location: Right upper arm, BP Patient Position: Lying left side)   Pulse 61   Temp 98.2 °F (36.8 °C)   Resp 18   Ht 5' 9\" (1.753 m)   Wt 50.2 kg (110 lb 10.7 oz)   SpO2 99%   BMI 16.34 kg/m²       Physical Exam:   Constitutional: pt is oriented to person, place, and time. Pt appears emaciated. HENT:   Head: Normocephalic and atraumatic. Eyes: Pupils are equal, round, and reactive to light. EOM are normal.   Cardiovascular: Normal rate, regular rhythm and normal heart sounds. Pulmonary/Chest: Breath sounds normal. No wheezes. No rales. Exhibits no tenderness. Abdominal: Soft. Bowel sounds are normal. There is no abdominal tenderness. There is no rebound and no guarding. Musculoskeletal: Normal range of motion. Neurological: Pt has AMS with dementia.         ASSESSMENT & PLAN:    Chest pain rule out MI  Hypertension  Chronic kidney disease on hemodialysis  History of CVA  Metastatic lung disease  Dementia       Patient is DNR  Continue amlodipine 10 mg daily aspirin 31 mg daily  Lipitor 40 mg daily hydralazine 50 mg 3 times a day Flomax 0.4 mg twice a day  Continue to monitor H&H. Continue phosphate binders for hyperphosphatemia.      Cardiology consult pending  Patient scheduled for modified barium swallow    Patient is DNR  Echo pending        Current Facility-Administered Medications:     aspirin chewable tablet 81 mg, 81 mg, Oral, DAILY, Salvador Kimbrough MD, 81 mg at 10/25/22 0905    atorvastatin (LIPITOR) tablet 40 mg, 40 mg, Oral, DAILY, Salvador Kimbrough MD, 40 mg at 10/25/22 1998    nitroglycerin (NITROBID) 2 % ointment 1 Inch, 1 Inch, Topical, Q6H PRN, Salvador Kimbrough MD    amLODIPine (NORVASC) tablet 10 mg, 10 mg, Oral, DAILY, Migdalia Keller MD, 10 mg at 10/25/22 9018    hydrALAZINE (APRESOLINE) tablet 50 mg, 50 mg, Oral, TID, Migdalia Keller MD, 50 mg at 10/25/22 1690    heparin (porcine) injection 5,000 Units, 5,000 Units, SubCUTAneous, Q8H, Salvador Kimbrough MD, 5,000 Units at 10/25/22 0506    tamsulosin (FLOMAX) capsule 0.4 mg, 0.4 mg, Oral, BID, Salvador Kimbrough MD, 0.4 mg at 10/25/22 0905    latanoprost (XALATAN) 0.005 % ophthalmic solution 1 Drop, 1 Drop, Both Eyes, QPM, Salvador Kimbrough MD, 1 Drop at 10/24/22 1809    tiotropium-olodateroL (STIOLTO RESPIMAT) 2.5-2.5 mcg/actuation inhaler 2 Puff, 2 Puff, Inhalation, DAILY, Salvador Kimbrough MD, 2 Puff at 10/25/22 3638    sevelamer carbonate (RENVELA) tab 800 mg, 800 mg, Oral, TID WITH MEALS, Salvador Kimbrough MD, 800 mg at 10/25/22 2805       Discussed with the patient's sister Samantha Hanna about lung metastatic cancer and have seen oncologist no further treatment planned    If patient remains stable possible discharge home tomorrow          ________________________________________________________________________    Signed: Verl Spring, MD

## 2022-10-25 NOTE — PROGRESS NOTES
Renal Progress Note    Patient: Juan Osman MRN: [id number]  SSN: [id number]    YOB: 1950  Age: 81 y.o. Sex: male      Admit Date: 10/23/2022    LOS: 1 day     Subjective:   Patient seen at bedside. Alert and awake, AMS, no acute distress. S/p HD yesterday. Current Facility-Administered Medications   Medication Dose Route Frequency    aspirin chewable tablet 81 mg  81 mg Oral DAILY    atorvastatin (LIPITOR) tablet 40 mg  40 mg Oral DAILY    nitroglycerin (NITROBID) 2 % ointment 1 Inch  1 Inch Topical Q6H PRN    amLODIPine (NORVASC) tablet 10 mg  10 mg Oral DAILY    hydrALAZINE (APRESOLINE) tablet 50 mg  50 mg Oral TID    heparin (porcine) injection 5,000 Units  5,000 Units SubCUTAneous Q8H    tamsulosin (FLOMAX) capsule 0.4 mg  0.4 mg Oral BID    latanoprost (XALATAN) 0.005 % ophthalmic solution 1 Drop  1 Drop Both Eyes QPM    tiotropium-olodateroL (STIOLTO RESPIMAT) 2.5-2.5 mcg/actuation inhaler 2 Puff  2 Puff Inhalation DAILY    sevelamer carbonate (RENVELA) tab 800 mg  800 mg Oral TID WITH MEALS        Vitals:    10/25/22 0745 10/25/22 0832 10/25/22 1033 10/25/22 1141   BP: (!) 151/94  (!) 145/89    Pulse: 70  61    Resp: 22  18    Temp: 97.6 °F (36.4 °C)  98.2 °F (36.8 °C)    TempSrc:       SpO2: 99% 99% 99%    Weight:       Height:    5' 9\" (1.753 m)     Objective:   General: alert awake AMS, no acute distress. HEENT: EOMI, no Icterus, no Pallor, pupils reactive, mucosa dry normal inspection of ears and nose,   Neck: Neck is supple, No JVD, no thyromegaly. Lungs: good air entry+ no rhonchi, no rales,  CVS: heart sounds normal,  no murmurs, no rubs. GI: soft, nontender, normal BS,   Extremeties: no clubbing, no cyanosis, no edema,  Neuro: Alert, awake, altered mental status, residual weakness from old CVA   Skin: normal skin turgor, no skin rashes       Intake and Output:  Current Shift: No intake/output data recorded.   Last three shifts: 10/23 1901 - 10/25 0700  In: -   Out: 1000 Lab/Data Review:  Recent Labs     10/24/22  0721 10/23/22  1846   WBC 10.9 14.6*   HGB 11.9* 12.8   HCT 36.6 39.9    295     Recent Labs     10/25/22  1113 10/24/22  0721 10/23/22  2137 10/23/22  1846    136  --  128*   K 4.8 5.5* 5.2* Hemolyzed, recollect requested   CL 99 102  --  99   CO2 25 26  --  26   * 90  --  97   BUN 61* 71*  --  62*   CREA 5.85* 7.50*  --  7.03*   CA 10.5* 10.9*  --  10.8*   MG  --   --  2.7* Hemolyzed, recollect requested   PHOS 5.6*  --   --   --    ALB 3.0*  --   --   --      No results for input(s): PH, PCO2, PO2, HCO3, FIO2 in the last 72 hours. Recent Results (from the past 24 hour(s))   RENAL FUNCTION PANEL    Collection Time: 10/25/22 11:13 AM   Result Value Ref Range    Sodium 136 136 - 145 mmol/L    Potassium 4.8 3.5 - 5.1 mmol/L    Chloride 99 97 - 108 mmol/L    CO2 25 21 - 32 mmol/L    Anion gap 12 5 - 15 mmol/L    Glucose 108 (H) 65 - 100 mg/dL    BUN 61 (H) 6 - 20 mg/dL    Creatinine 5.85 (H) 0.70 - 1.30 mg/dL    BUN/Creatinine ratio 10 (L) 12 - 20      eGFR 10 (L) >60 ml/min/1.73m2    Calcium 10.5 (H) 8.5 - 10.1 mg/dL    Phosphorus 5.6 (H) 2.6 - 4.7 mg/dL    Albumin 3.0 (L) 3.5 - 5.0 g/dL        Assessment and Plan:     End-stage renal disease on hemodialysis: on MWF at [facility]  No complaints of any fluid overload, s/p HD yesterday  Inpatient hemodialysis arranged for tomorrow  Will continue maintenance hemodialysis on Monday Wednesdays and Fridays      2. Hypertension: Stable  Continue current blood pressure medications and continue to monitor blood pressures     3. chest pain: Nonspecific, patient unable to explain well  May be related to lung lesions/large lung mass  Borderline troponin elevations+     4.   Hyperkalemia: Secondary to end-stage renal disease  resolved with hemodialysis today     5, Anemia: Stable hemoglobin   Continue to monitor H&H     6. Renal osteodystrophy: High calcium level 10.5, probably with metastatic malignanacy  Check PTH tomorrow  will use low calcium bath with hemodialysis     7. Hyperphosphatemia: continue phosphate binders  Phos is 5.6 today, continue to monitor  Secondary hyperparathyroidism: Continue Zemplar/Sensipar as per outpatient protocol     8. Metastatic lung cancer: Sister Chaya Akers is his legal guardian, she is aware of his advanced malignancy and awaiting to make a decision on comfort care.      OK to dc home tomorrow post HD    Signed By: Erika Ferrara MD     October 25, 2022

## 2022-10-25 NOTE — H&P
History and Physical    NAME: Estefani Kinney   :  1950   MRN:  [id number]     Date/Time:  10/25/2022 6:14 PM    Patient PCP: Uyen Cameron MD  ______________________________________________________________________             Subjective:     CHIEF COMPLAINT:   Chest pain        HISTORY OF PRESENT ILLNESS:       Patient is a 81y.o. year old male with signal past medical history of end-stage renal disease on hemodialysis CVA hypertension metastatic lung cancer came to emergency room complaining of chest pain seen by the ER physician  Patient not a good historian but denies of any radiation no fever no chills or diaphoresis no palpitation generalized weakness with the seen by the ER physician and recommended patient to be admitted for acute chest pain    10/25  Pt is alert and awake, resting comfortably in bed. He responds minimally to questions. Pt is a poor historian. Denies chest pain, dyspnea, fever, chills, n/v, changes in bladder or bowel habits. Seen by nephrology yesterday. Pt has ESRD on hemodialysis MWF at [facility]. No fluid overload complaints. XR Chest from 10/23  Left-sided mass is unchanged  Right basilar atelectasis    CT Chest WO Cont from 10/07  Previously noted possible mass in left upper lobe orresponds to a 3.8 x 4.1 cm mass. There are multiple other nodular masses. Findings are suggestive of metastatic disease. The left upper lobe mass may represent a primary or metastasis as well. Multiple liver masses are noted with perihepatic ascites. Large mass infiltrating most of the right hepatic lobe. Questionable biliary dilatation. Further assessment is limited. Past Medical History:   Diagnosis Date    Cancer [facility])     Chronic kidney disease     Dialysis patient [facility])     Hypertension     Stroke [facility])         History reviewed. No pertinent surgical history.     Social History     Tobacco Use    Smoking status: Former     Packs/day: 1.00     Years: 40.00     Pack years: 40.00 Types: Cigarettes    Smokeless tobacco: Never   Substance Use Topics    Alcohol use: Not Currently        History reviewed. No pertinent family history. No Known Allergies     Prior to Admission medications    Medication Sig Start Date End Date Taking? Authorizing Provider   tamsulosin (FLOMAX) 0.4 mg capsule Take 0.4 mg by mouth two (2) times a day. Yes Provider, Historical   aspirin 81 mg chewable tablet Take 81 mg by mouth daily. Yes Provider, Historical   amLODIPine (NORVASC) 10 mg tablet Take 10 mg by mouth daily. Yes Provider, Historical   latanoprost (XALATAN) 0.005 % ophthalmic solution Administer 1 Drop to both eyes nightly. Yes Provider, Historical   sevelamer (RENAGEL) 400 mg tablet Take  by mouth three (3) times daily (with meals).    Yes Provider, Historical         Current Facility-Administered Medications:     aspirin chewable tablet 81 mg, 81 mg, Oral, DAILY, Salvador Kimbrough MD, 81 mg at 10/24/22 1550    atorvastatin (LIPITOR) tablet 40 mg, 40 mg, Oral, DAILY, Salvador Kimbrough MD, 40 mg at 10/24/22 1550    nitroglycerin (NITROBID) 2 % ointment 1 Inch, 1 Inch, Topical, Q6H PRN, Salvador Kimbrough MD    amLODIPine (NORVASC) tablet 10 mg, 10 mg, Oral, DAILY, Salvador Kimbrough MD, 10 mg at 10/24/22 1550    hydrALAZINE (APRESOLINE) tablet 50 mg, 50 mg, Oral, TID, Salvador Kimbrough MD, 50 mg at 10/24/22 2136    heparin (porcine) injection 5,000 Units, 5,000 Units, SubCUTAneous, Q8H, Salvador Kimbrough MD, 5,000 Units at 10/25/22 0506    tamsulosin (FLOMAX) capsule 0.4 mg, 0.4 mg, Oral, BID, Salvador Kimbrough MD, 0.4 mg at 10/24/22 2137    latanoprost (XALATAN) 0.005 % ophthalmic solution 1 Drop, 1 Drop, Both Eyes, QPM, Salvador Kimbrough MD, 1 Drop at 10/24/22 1809    tiotropium-olodateroL (STIOLTO RESPIMAT) 2.5-2.5 mcg/actuation inhaler 2 Puff, 2 Puff, Inhalation, DAILY, Salvador Kimbrough MD    sevelamer carbonate (RENVELA) tab 800 mg, 800 mg, Oral, TID WITH MEALS, Denia Kimbrough MD, 800 mg at 10/24/22 1710    LAB DATA REVIEWED:    No results found for this or any previous visit (from the past 24 hour(s)). XR Results (most recent):  Results from Hospital Encounter encounter on 10/23/22    XR CHEST PORT    Narrative  INDICATION:  CP    Exam: Portable chest 1911. Comparison: 10/7/2022. Findings: Cardiomediastinal silhouette is within normal limits. Pulmonary  vasculature is not engorged. Increasing atelectasis at the right lung base. Left  upper lobe mass and nodule at the left base unchanged. No pneumothorax or  definite effusion    Impression  1. Left-sided mass is unchanged  2. Right basilar atelectasis         XR CHEST PORT   Final Result   1. Left-sided mass is unchanged   2. Right basilar atelectasis      XR SWALLOW FUNC VIDEO    (Results Pending)        Review of Systems:  Constitutional: Negative for chills and fever. HENT: Negative. Eyes: Negative. Respiratory: Negative. Cardiovascular: Negative. Gastrointestinal: Negative for abdominal pain and nausea. Skin: Negative. Neurological: Negative. Objective:   VITALS:    Visit Vitals  BP (!) 151/94 (BP 1 Location: Right upper arm, BP Patient Position: Supine)   Pulse 70   Temp 97.6 °F (36.4 °C)   Resp 22   Ht 5' 9\" (1.753 m)   Wt 50.2 kg (110 lb 10.7 oz)   SpO2 99%   BMI 16.34 kg/m²       Physical Exam:   Constitutional: pt is oriented to person, place, and time. Pt appears emaciated. HENT:   Head: Normocephalic and atraumatic. Eyes: Pupils are equal, round, and reactive to light. EOM are normal.   Cardiovascular: Normal rate, regular rhythm and normal heart sounds. Pulmonary/Chest: Breath sounds normal. No wheezes. No rales. Exhibits no tenderness. Abdominal: Soft. Bowel sounds are normal. There is no abdominal tenderness. There is no rebound and no guarding. Musculoskeletal: Normal range of motion. Neurological: Pt has AMS with dementia.         ASSESSMENT & PLAN:    Chest pain rule out MI  Hypertension  Chronic kidney disease on hemodialysis  History of CVA  Metastatic lung disease  Dementia       Patient is DNR  Continue amlodipine 10 mg daily aspirin 31 mg daily  Lipitor 40 mg daily hydralazine 50 mg 3 times a day Flomax 0.4 mg twice a day  Continue to monitor H&H. Continue phosphate binders for hyperphosphatemia.          Current Facility-Administered Medications:     aspirin chewable tablet 81 mg, 81 mg, Oral, DAILY, Salvador Kimbrough MD, 81 mg at 10/24/22 1550    atorvastatin (LIPITOR) tablet 40 mg, 40 mg, Oral, DAILY, Salvador Kimbrough MD, 40 mg at 10/24/22 1550    nitroglycerin (NITROBID) 2 % ointment 1 Inch, 1 Inch, Topical, Q6H PRN, Salvador Kimbrough MD    amLODIPine (NORVASC) tablet 10 mg, 10 mg, Oral, DAILY, Salvador Kimbrough MD, 10 mg at 10/24/22 1550    hydrALAZINE (APRESOLINE) tablet 50 mg, 50 mg, Oral, TID, Salvador Kimbrough MD, 50 mg at 10/24/22 2136    heparin (porcine) injection 5,000 Units, 5,000 Units, SubCUTAneous, Q8H, Salvador Kimbrough MD, 5,000 Units at 10/25/22 0506    tamsulosin (FLOMAX) capsule 0.4 mg, 0.4 mg, Oral, BID, Salvador Kimbrough MD, 0.4 mg at 10/24/22 2137    latanoprost (XALATAN) 0.005 % ophthalmic solution 1 Drop, 1 Drop, Both Eyes, QPM, Salvador Kimbrough MD, 1 Drop at 10/24/22 1809    tiotropium-olodateroL (STIOLTO RESPIMAT) 2.5-2.5 mcg/actuation inhaler 2 Puff, 2 Puff, Inhalation, DAILY, Salvador Kimbrough MD    sevelamer carbonate (RENVELA) tab 800 mg, 800 mg, Oral, TID WITH MEALS, Salvador Kimbrough MD, 800 mg at 10/24/22 1710       Discussed with the patient's sister Haydee Shearer about lung metastatic cancer and have seen oncologist no further treatment planned    If patient remains stable possible discharge home tomorrow          ________________________________________________________________________    Signed: Betsy Monreal

## 2022-10-25 NOTE — PROGRESS NOTES
Problem: Falls - Risk of  Goal: *Absence of Falls  Description: Document Brittney Johnson Fall Risk and appropriate interventions in the flowsheet.   Outcome: Progressing Towards Goal  Note: Fall Risk Interventions:  Mobility Interventions: Bed/chair exit alarm, OT consult for ADLs, PT Consult for mobility concerns, Patient to call before getting OOB, Utilize walker, cane, or other assistive device    Mentation Interventions: Adequate sleep, hydration, pain control, Bed/chair exit alarm, Increase mobility, Reorient patient, More frequent rounding    Medication Interventions: Bed/chair exit alarm, Patient to call before getting OOB, Teach patient to arise slowly    Elimination Interventions: Bed/chair exit alarm, Call light in reach    History of Falls Interventions: Bed/chair exit alarm         Problem: Patient Education: Go to Patient Education Activity  Goal: Patient/Family Education  Outcome: Progressing Towards Goal

## 2022-10-26 ENCOUNTER — APPOINTMENT (OUTPATIENT)
Dept: NON INVASIVE DIAGNOSTICS | Age: 72
DRG: 180 | End: 2022-10-26
Attending: FAMILY MEDICINE
Payer: MEDICARE

## 2022-10-26 ENCOUNTER — APPOINTMENT (OUTPATIENT)
Dept: GENERAL RADIOLOGY | Age: 72
DRG: 180 | End: 2022-10-26
Attending: FAMILY MEDICINE
Payer: MEDICARE

## 2022-10-26 LAB
ALBUMIN SERPL-MCNC: 2.8 G/DL (ref 3.5–5)
ANION GAP SERPL CALC-SCNC: 10 MMOL/L (ref 5–15)
BUN SERPL-MCNC: 72 MG/DL (ref 6–20)
BUN/CREAT SERPL: 11 (ref 12–20)
CA-I BLD-MCNC: 11 MG/DL (ref 8.5–10.1)
CHLORIDE SERPL-SCNC: 99 MMOL/L (ref 97–108)
CO2 SERPL-SCNC: 26 MMOL/L (ref 21–32)
CREAT SERPL-MCNC: 6.46 MG/DL (ref 0.7–1.3)
GLUCOSE SERPL-MCNC: 100 MG/DL (ref 65–100)
PHOSPHATE SERPL-MCNC: 6.1 MG/DL (ref 2.6–4.7)
POTASSIUM SERPL-SCNC: 5 MMOL/L (ref 3.5–5.1)
SODIUM SERPL-SCNC: 135 MMOL/L (ref 136–145)

## 2022-10-26 PROCEDURE — 74011250637 HC RX REV CODE- 250/637: Performed by: INTERNAL MEDICINE

## 2022-10-26 PROCEDURE — 92611 MOTION FLUOROSCOPY/SWALLOW: CPT

## 2022-10-26 PROCEDURE — 65270000029 HC RM PRIVATE

## 2022-10-26 PROCEDURE — 80069 RENAL FUNCTION PANEL: CPT

## 2022-10-26 PROCEDURE — 74230 X-RAY XM SWLNG FUNCJ C+: CPT

## 2022-10-26 PROCEDURE — 74011000250 HC RX REV CODE- 250: Performed by: FAMILY MEDICINE

## 2022-10-26 PROCEDURE — 74011250636 HC RX REV CODE- 250/636: Performed by: FAMILY MEDICINE

## 2022-10-26 PROCEDURE — 36415 COLL VENOUS BLD VENIPUNCTURE: CPT

## 2022-10-26 PROCEDURE — 5A1D70Z PERFORMANCE OF URINARY FILTRATION, INTERMITTENT, LESS THAN 6 HOURS PER DAY: ICD-10-PCS | Performed by: INTERNAL MEDICINE

## 2022-10-26 PROCEDURE — 93306 TTE W/DOPPLER COMPLETE: CPT

## 2022-10-26 PROCEDURE — 74011250637 HC RX REV CODE- 250/637: Performed by: FAMILY MEDICINE

## 2022-10-26 PROCEDURE — 90935 HEMODIALYSIS ONE EVALUATION: CPT

## 2022-10-26 RX ORDER — DILTIAZEM HYDROCHLORIDE 240 MG/1
240 CAPSULE, COATED, EXTENDED RELEASE ORAL DAILY
Qty: 30 CAPSULE | Refills: 2 | Status: SHIPPED | OUTPATIENT
Start: 2022-10-26

## 2022-10-26 RX ORDER — ATORVASTATIN CALCIUM 40 MG/1
40 TABLET, FILM COATED ORAL DAILY
Qty: 60 TABLET | Refills: 0 | Status: SHIPPED | OUTPATIENT
Start: 2022-10-26

## 2022-10-26 RX ORDER — SEVELAMER CARBONATE 800 MG/1
800 TABLET, FILM COATED ORAL
Qty: 60 TABLET | Refills: 0 | Status: SHIPPED | OUTPATIENT
Start: 2022-10-26

## 2022-10-26 RX ORDER — HYDRALAZINE HYDROCHLORIDE 50 MG/1
50 TABLET, FILM COATED ORAL 3 TIMES DAILY
Qty: 60 TABLET | Refills: 0 | Status: SHIPPED | OUTPATIENT
Start: 2022-10-26

## 2022-10-26 RX ORDER — METOPROLOL SUCCINATE 50 MG/1
50 TABLET, EXTENDED RELEASE ORAL DAILY
Qty: 60 TABLET | Refills: 0 | Status: SHIPPED | OUTPATIENT
Start: 2022-10-26

## 2022-10-26 RX ORDER — NITROGLYCERIN 0.4 MG/1
0.4 TABLET SUBLINGUAL AS NEEDED
Qty: 60 TABLET | Refills: 2 | Status: SHIPPED | OUTPATIENT
Start: 2022-10-26

## 2022-10-26 RX ADMIN — METOPROLOL SUCCINATE 50 MG: 50 TABLET, EXTENDED RELEASE ORAL at 11:50

## 2022-10-26 RX ADMIN — HYDRALAZINE HYDROCHLORIDE 50 MG: 50 TABLET, FILM COATED ORAL at 11:50

## 2022-10-26 RX ADMIN — SEVELAMER CARBONATE 800 MG: 800 TABLET, FILM COATED ORAL at 11:50

## 2022-10-26 RX ADMIN — BARIUM SULFATE 5 ML: 400 SUSPENSION ORAL at 11:36

## 2022-10-26 RX ADMIN — ATORVASTATIN CALCIUM 40 MG: 40 TABLET, FILM COATED ORAL at 11:50

## 2022-10-26 RX ADMIN — HEPARIN SODIUM 5000 UNITS: 5000 INJECTION INTRAVENOUS; SUBCUTANEOUS at 21:01

## 2022-10-26 RX ADMIN — BARIUM SULFATE 25 ML: 0.81 POWDER, FOR SUSPENSION ORAL at 11:36

## 2022-10-26 RX ADMIN — ASPIRIN 81 MG CHEWABLE TABLET 81 MG: 81 TABLET CHEWABLE at 11:50

## 2022-10-26 RX ADMIN — HEPARIN SODIUM 5000 UNITS: 5000 INJECTION INTRAVENOUS; SUBCUTANEOUS at 14:33

## 2022-10-26 RX ADMIN — HEPARIN SODIUM 5000 UNITS: 5000 INJECTION INTRAVENOUS; SUBCUTANEOUS at 05:54

## 2022-10-26 RX ADMIN — TAMSULOSIN HYDROCHLORIDE 0.4 MG: 0.4 CAPSULE ORAL at 21:01

## 2022-10-26 RX ADMIN — LATANOPROST 1 DROP: 50 SOLUTION OPHTHALMIC at 18:00

## 2022-10-26 RX ADMIN — DILTIAZEM HYDROCHLORIDE 240 MG: 120 CAPSULE, COATED, EXTENDED RELEASE ORAL at 11:50

## 2022-10-26 RX ADMIN — BARIUM SULFATE 5 ML: 400 SUSPENSION ORAL at 11:38

## 2022-10-26 RX ADMIN — TAMSULOSIN HYDROCHLORIDE 0.4 MG: 0.4 CAPSULE ORAL at 11:50

## 2022-10-26 RX ADMIN — BARIUM SULFATE 5 ML: 400 PASTE ORAL at 11:36

## 2022-10-26 RX ADMIN — HYDRALAZINE HYDROCHLORIDE 50 MG: 50 TABLET, FILM COATED ORAL at 17:01

## 2022-10-26 RX ADMIN — HYDRALAZINE HYDROCHLORIDE 50 MG: 50 TABLET, FILM COATED ORAL at 21:01

## 2022-10-26 RX ADMIN — SEVELAMER CARBONATE 800 MG: 800 TABLET, FILM COATED ORAL at 17:01

## 2022-10-26 NOTE — PROGRESS NOTES
Renal Progress Note    Patient: Melvi Doss MRN: [id number]  SSN: [id number]    YOB: 1950  Age: 81 y.o. Sex: male      Admit Date: 10/23/2022    LOS: 2 days     Subjective:   Patient seen on HD. Alert and awake, AMS, no acute distress. Tolerated well, no edema no sob      Current Facility-Administered Medications   Medication Dose Route Frequency    metoprolol succinate (TOPROL-XL) XL tablet 50 mg  50 mg Oral DAILY    dilTIAZem ER (CARDIZEM CD) capsule 240 mg  240 mg Oral DAILY    nitroglycerin (NITROSTAT) tablet 0.4 mg  0.4 mg SubLINGual PRN    aspirin chewable tablet 81 mg  81 mg Oral DAILY    atorvastatin (LIPITOR) tablet 40 mg  40 mg Oral DAILY    nitroglycerin (NITROBID) 2 % ointment 1 Inch  1 Inch Topical Q6H PRN    hydrALAZINE (APRESOLINE) tablet 50 mg  50 mg Oral TID    heparin (porcine) injection 5,000 Units  5,000 Units SubCUTAneous Q8H    tamsulosin (FLOMAX) capsule 0.4 mg  0.4 mg Oral BID    latanoprost (XALATAN) 0.005 % ophthalmic solution 1 Drop  1 Drop Both Eyes QPM    tiotropium-olodateroL (STIOLTO RESPIMAT) 2.5-2.5 mcg/actuation inhaler 2 Puff  2 Puff Inhalation DAILY    sevelamer carbonate (RENVELA) tab 800 mg  800 mg Oral TID WITH MEALS        Vitals:    10/26/22 0915 10/26/22 0945 10/26/22 1020 10/26/22 1522   BP: 139/85 137/89 138/89 134/76   Pulse: 64 66 64 67   Resp:   18 18   Temp:   98.6 °F (37 °C) 97.7 °F (36.5 °C)   TempSrc:   Oral    SpO2:    98%   Weight:       Height:         Objective:   General: alert awake AMS, no acute distress. HEENT: EOMI, no Icterus, no Pallor, pupils reactive, mucosa dry normal inspection of ears and nose,   Neck: Neck is supple, No JVD, no thyromegaly. Lungs: good air entry+ no rhonchi, no rales,  CVS: heart sounds normal,  no murmurs, no rubs.   GI: soft, nontender, normal BS,   Extremeties: no clubbing, no cyanosis, no edema,  Neuro: Alert, awake, altered mental status, residual weakness from old CVA   Skin: normal skin turgor, no skin zuly       Intake and Output:  Current Shift: No intake/output data recorded. Last three shifts: 10/25 0701 - 10/26 1900  In: -   Out: 1000       Lab/Data Review:  Recent Labs     10/24/22  0721   WBC 10.9   HGB 11.9*   HCT 36.6        Recent Labs     10/26/22  0710 10/25/22  1113 10/24/22  0721 10/23/22  2137   * 136 136  --    K 5.0 4.8 5.5* 5.2*   CL 99 99 102  --    CO2 26 25 26  --     108* 90  --    BUN 72* 61* 71*  --    CREA 6.46* 5.85* 7.50*  --    CA 11.0* 10.5* 10.9*  --    MG  --   --   --  2.7*   PHOS 6.1* 5.6*  --   --    ALB 2.8* 3.0*  --   --      No results for input(s): PH, PCO2, PO2, HCO3, FIO2 in the last 72 hours.   Recent Results (from the past 24 hour(s))   RENAL FUNCTION PANEL    Collection Time: 10/26/22  7:10 AM   Result Value Ref Range    Sodium 135 (L) 136 - 145 mmol/L    Potassium 5.0 3.5 - 5.1 mmol/L    Chloride 99 97 - 108 mmol/L    CO2 26 21 - 32 mmol/L    Anion gap 10 5 - 15 mmol/L    Glucose 100 65 - 100 mg/dL    BUN 72 (H) 6 - 20 mg/dL    Creatinine 6.46 (H) 0.70 - 1.30 mg/dL    BUN/Creatinine ratio 11 (L) 12 - 20      eGFR 9 (L) >60 ml/min/1.73m2    Calcium 11.0 (H) 8.5 - 10.1 mg/dL    Phosphorus 6.1 (H) 2.6 - 4.7 mg/dL    Albumin 2.8 (L) 3.5 - 5.0 g/dL   ECHO ADULT COMPLETE    Collection Time: 10/26/22 12:57 PM   Result Value Ref Range    LV EDV A2C 29 mL    LV EDV A4C 93 mL    LV ESV A2C 10 mL    LV ESV A4C 39 mL    IVSd 1.3 0.6 - 1.0 cm    LVIDd 4.1 4.2 - 5.9 cm    LVIDs 2.8 cm    LVOT Diameter 1.8 cm    LVOT Mean Gradient 3 mmHg    LVOT VTI 18.2 cm    LVOT Peak Velocity 1.3 m/s    LVOT Peak Gradient 7 mmHg    LVPWd 1.3 0.6 - 1.0 cm    LV E' Lateral Velocity 7 cm/s    LV E' Septal Velocity 6 cm/s    LV Ejection Fraction A2C 67 %    LV Ejection Fraction A4C 58 %    LVOT Area 2.5 cm2    LVOT SV 46.3 ml    LA Minor Axis 3.9 cm    LA Major Sullivan 4.4 cm    LA Area 2C 8.7 cm2    LA Area 4C 15.7 cm2    LA Volume BP 25 18 - 58 mL    LA Diameter 3.5 cm    RA Area 4C 13.2 cm2    RA Volume 34 ml    AV Mean Gradient 5 mmHg    AV VTI 27.2 cm    AV Mean Velocity 1.0 m/s    AV Peak Velocity 1.7 m/s    AV Peak Gradient 11 mmHg    AV Area by VTI 1.7 cm2    AV Area by Peak Velocity 1.9 cm2    Aortic Root 3.4 cm    MR VTI 70.4 cm    MV Mean Gradient 1 mmHg    MV VTI 19.6 cm    MV Mean Velocity 0.4 m/s    MR Peak Velocity 2.5 m/s    MR Peak Gradient 25 mmHg    MV Max Velocity 0.9 m/s    MV Peak Gradient 3 mmHg    MV A Velocity 0.85 m/s    MV E Velocity 0.55 m/s    MV Area by VTI 2.4 cm2    RV Basal Dimension 4.2 cm    RV Mid Dimension 2.8 cm    TAPSE 3.6 1.7 cm    TR Max Velocity 2.25 m/s    TR Peak Gradient 20 mmHg    Fractional Shortening 2D 32 28 - 44 %    LV ESV Index A4C 24 mL/m2    LV EDV Index A4C 58 mL/m2    LV ESV Index A2C 6 mL/m2    LV EDV Index A2C 18 mL/m2    LVIDd Index 2.55 cm/m2    LVIDs Index 1.74 cm/m2    LV RWT Ratio 0.63     LV Mass 2D 193.5 88 - 224 g    LV Mass 2D Index 120.2 49 - 115 g/m2    MV E/A 0.65     E/E' Ratio (Averaged) 8.51     E/E' Lateral 7.86     E/E' Septal 9.17     LA Volume Index BP 16 16 - 34 ml/m2    LVOT Stroke Volume Index 28.8 mL/m2    LA Size Index 2.17 cm/m2    LA/AO Root Ratio 1.03     RA Volume Index A4C 21 mL/m2    Ao Root Index 2.11 cm/m2    AV Velocity Ratio 0.76     LVOT:AV VTI Index 0.67     CAROLA/BSA VTI 1.1 cm2/m2    CAROLA/BSA Peak Velocity 1.2 cm2/m2    MV:LVOT VTI Index 1.08     Est. RA Pressure 3 mmHg    RVSP 23 mmHg    LA Volume 4C 41 18 - 58 mL    LA Volume Index 4C 25 16 - 34 mL/m2    RA Volume BP 34 mL    RA Volume Index BP 21 mL/m2        Assessment and Plan:     End-stage renal disease on hemodialysis: on MWF at [facility]  No fluid overload, Inpatient hemodialysis arranged for today, seen on HD  Will continue maintenance hemodialysis on Monday Wednesdays and Fridays      2. Hypertension: Stable  Continue current blood pressure medications and continue to monitor blood pressures     3.   chest pain: Nonspecific, patient unable to explain well  May be related to lung lesions/large lung mass  Borderline troponin elevations+     4. Hyperkalemia: Secondary to end-stage renal disease  resolved with hemodialysis      5, Anemia: Stable hemoglobin   Continue to monitor H&H     6. Renal osteodystrophy: High calcium level 11 probably with metastatic malignanacy  Check PTH tomorrow  used low calcium bath with hemodialysis today, repeat calcium tomorrow     7. Hyperphosphatemia: continue phosphate binders  Phos is 6.1 today, continue to monitor  Secondary hyperparathyroidism: repeAT pth LEVEL     8. Metastatic lung cancer: Sister Chaya Akers is his legal guardian, she is aware of his advanced malignancy and awaiting to make a decision on comfort care.        Signed By: Erika Ferrara MD     October 26, 2022

## 2022-10-26 NOTE — PROGRESS NOTES
Patient DAMASOP is home with his sister and At [facility] hospice. Hospice should deliver equipment to home today. If all equipment delivered today, patient will discharge today. If not all equipment delivered to home by hospice, patient will discharge tomorrow. Patient currently in dialysis. Patient will require ambulance transport at discharge.

## 2022-10-26 NOTE — DIALYSIS
Pt tolerated treatment well. No complaints voiced during or post treatment. V/S stable post treatment. Reduced pt goal due to drop in bp within 30mins of treatment. Removed 1.0kg this treatment without difficulty. Pt discharged to X-Ray via hospital transportation telemetry notified of arrival and discharge. Nurse Pina Bray given report.

## 2022-10-26 NOTE — PROGRESS NOTES
Dual skin assessment performed with second RN. No open wounds noted. Pt has a intact mepilex to the sacrum for preventive measures.

## 2022-10-26 NOTE — DISCHARGE SUMMARY
Discharge Summary       PATIENT ID: Cuate Richardson  MRN: [id number]   YOB: 1950    DATE OF ADMISSION: 10/23/2022  6:18 PM    DATE OF DISCHARGE:   PRIMARY CARE PROVIDER: Devika Nieves MD     ATTENDING PHYSICIAN: Elvis Kimbrough  DISCHARGING PROVIDER: Elvis Kimbrough      CONSULTATIONS: IP CONSULT TO NEPHROLOGY  IP CONSULT TO CARDIOLOGY    PROCEDURES/SURGERIES: * No surgery found *    ADMITTING DIAGNOSES:    Patient Active Problem List    Diagnosis Date Noted    Severe protein-calorie malnutrition ([address] 10/25/2022    Chest pain 10/23/2022       DISCHARGE DIAGNOSES / PLAN:      Chest pain rule out MI  Hypertension  Chronic kidney disease on hemodialysis  History of CVA  Metastatic lung disease  Dementia        DISCHARGE MEDICATIONS:  Current Discharge Medication List        START taking these medications    Details   atorvastatin (LIPITOR) 40 mg tablet Take 1 Tablet by mouth daily. Qty: 60 Tablet, Refills: 0  Start date: 10/26/2022      dilTIAZem ER (CARDIZEM CD) 240 mg capsule Take 1 Capsule by mouth daily. Qty: 30 Capsule, Refills: 2  Start date: 10/26/2022      hydrALAZINE (APRESOLINE) 50 mg tablet Take 1 Tablet by mouth three (3) times daily. Qty: 60 Tablet, Refills: 0  Start date: 10/26/2022      metoprolol succinate (TOPROL-XL) 50 mg XL tablet Take 1 Tablet by mouth daily. Qty: 60 Tablet, Refills: 0  Start date: 10/26/2022      nitroglycerin (NITROSTAT) 0.4 mg SL tablet 1 Tablet by SubLINGual route as needed for Chest Pain. Up to 3 doses. Qty: 60 Tablet, Refills: 2  Start date: 10/26/2022      sevelamer carbonate (RENVELA) 800 mg tab tab Take 1 Tablet by mouth three (3) times daily (with meals). Qty: 60 Tablet, Refills: 0  Start date: 10/26/2022      tiotropium-olodateroL (STIOLTO RESPIMAT) 2.5-2.5 mcg/actuation inhaler Take 2 Puffs by inhalation daily.   Qty: 4 g, Refills: 2  Start date: 10/27/2022           CONTINUE these medications which have NOT CHANGED    Details   tamsulosin (FLOMAX) 0.4 mg capsule Take 0.4 mg by mouth two (2) times a day. aspirin 81 mg chewable tablet Take 81 mg by mouth daily. latanoprost (XALATAN) 0.005 % ophthalmic solution Administer 1 Drop to both eyes nightly. STOP taking these medications       amLODIPine (NORVASC) 10 mg tablet Comments:   Reason for Stopping:         sevelamer (RENAGEL) 400 mg tablet Comments:   Reason for Stopping:                 NOTIFY YOUR PHYSICIAN FOR ANY OF THE FOLLOWING:   Fever over 101 degrees for 24 hours. Chest pain, shortness of breath, fever, chills, nausea, vomiting, diarrhea, change in mentation, falling, weakness, bleeding. Severe pain or pain not relieved by medications. Or, any other signs or symptoms that you may have questions about. DISPOSITION:  x  Home With:   OT  PT  HH  RN       Long term SNF/Inpatient Rehab    Independent/assisted living    Hospice    Other:       PATIENT CONDITION AT DISCHARGE: Stable      PHYSICAL EXAMINATION AT DISCHARGE:  General:          Alert, cooperative, no distress, appears stated age. HEENT:           Atraumatic, anicteric sclerae, pink conjunctivae                          No oral ulcers, mucosa moist, throat clear, dentition fair  Neck:               Supple, symmetrical  Lungs:             Clear to auscultation bilaterally. No Wheezing or Rhonchi. No rales. Chest wall:      No tenderness  No Accessory muscle use. Heart:              Regular  rhythm,  No  murmur   No edema  Abdomen:        Soft, non-tender. Not distended. Bowel sounds normal  Extremities:     No cyanosis. No clubbing,                            Skin turgor normal, Capillary refill normal  Skin:                Not pale. Not Jaundiced  No rashes   Psych:             Not anxious or agitated. Neurologic:      Alert, moves all extremities, answers questions appropriately and responds to commands     XR CHEST PORT   Final Result   1. Left-sided mass is unchanged   2.  Right basilar atelectasis      XR SWALLOW FUNC VIDEO    (Results Pending)        Recent Results (from the past 24 hour(s))   RENAL FUNCTION PANEL    Collection Time: 10/25/22 11:13 AM   Result Value Ref Range    Sodium 136 136 - 145 mmol/L    Potassium 4.8 3.5 - 5.1 mmol/L    Chloride 99 97 - 108 mmol/L    CO2 25 21 - 32 mmol/L    Anion gap 12 5 - 15 mmol/L    Glucose 108 (H) 65 - 100 mg/dL    BUN 61 (H) 6 - 20 mg/dL    Creatinine 5.85 (H) 0.70 - 1.30 mg/dL    BUN/Creatinine ratio 10 (L) 12 - 20      eGFR 10 (L) >60 ml/min/1.73m2    Calcium 10.5 (H) 8.5 - 10.1 mg/dL    Phosphorus 5.6 (H) 2.6 - 4.7 mg/dL    Albumin 3.0 (L) 3.5 - 5.0 g/dL   EKG, 12 LEAD, SUBSEQUENT    Collection Time: 10/25/22  3:51 PM   Result Value Ref Range    Ventricular Rate 97 BPM    Atrial Rate 97 BPM    P-R Interval 148 ms    QRS Duration 88 ms    Q-T Interval 332 ms    QTC Calculation (Bezet) 421 ms    Calculated P Axis 55 degrees    Calculated R Axis 67 degrees    Calculated T Axis 136 degrees    Diagnosis       Sinus rhythm with Premature supraventricular complexes  Nonspecific ST and T wave abnormality  Abnormal ECG  When compared with ECG of 23-OCT-2022 18:28,  Premature supraventricular complexes are now Present  Non-specific change in ST segment in Inferior leads  Nonspecific T wave abnormality now evident in Inferior leads  T wave inversion now evident in Lateral leads  Confirmed by Luis Botello MD, LIZY ([id number]) on 10/25/2022 6:45:50 PM     RENAL FUNCTION PANEL    Collection Time: 10/26/22  7:10 AM   Result Value Ref Range    Sodium 135 (L) 136 - 145 mmol/L    Potassium 5.0 3.5 - 5.1 mmol/L    Chloride 99 97 - 108 mmol/L    CO2 26 21 - 32 mmol/L    Anion gap 10 5 - 15 mmol/L    Glucose 100 65 - 100 mg/dL    BUN 72 (H) 6 - 20 mg/dL    Creatinine 6.46 (H) 0.70 - 1.30 mg/dL    BUN/Creatinine ratio 11 (L) 12 - 20      eGFR 9 (L) >60 ml/min/1.73m2    Calcium 11.0 (H) 8.5 - 10.1 mg/dL    Phosphorus 6.1 (H) 2.6 - 4.7 mg/dL    Albumin 2.8 (L) 3.5 - 5.0 g/dL          HOSPITAL COURSE:    Patient is a 81y.o. year old male with signal past medical history of end-stage renal disease on hemodialysis CVA hypertension metastatic lung cancer came to emergency room complaining of chest pain seen by the ER physician  Patient not a good historian but denies of any radiation no fever no chills or diaphoresis no palpitation generalized weakness with the seen by the ER physician and recommended patient to be admitted for acute chest pain     10/25  Pt is alert and awake, resting comfortably in bed. He responds minimally to questions. Pt is a poor historian. Denies chest pain, dyspnea, fever, chills, n/v, changes in bladder or bowel habits. Seen by nephrology yesterday. Pt has ESRD on hemodialysis MWF at [facility]. No fluid overload complaints. XR Chest from 10/23  Left-sided mass is unchanged  Right basilar atelectasis     CT Chest WO Cont from 10/07  Previously noted possible mass in left upper lobe orresponds to a 3.8 x 4.1 cm mass. There are multiple other nodular masses. Findings are suggestive of metastatic disease. The left upper lobe mass may represent a primary or metastasis as well. Multiple liver masses are noted with perihepatic ascites. Large mass infiltrating most of the right hepatic lobe. Questionable biliary dilatation.  Further assessment       Patient was seen by the nephrology and also seen by the cardiology during this admission    Cardiology adjusting medication add beta-blocker and Cardizem also seen by nephrology regarding dialysis patient have metastatic lung disease plan for discharge home with hospice    Discussed with the     Medication reconciliation done time shopping 35 minutes 50% time spent counseling and coordination of care    Signed:   Ankush Ritchie MD  10/26/2022  11:03 AM

## 2022-10-26 NOTE — PROGRESS NOTES
Modified Barium Swallow Evaluation    Patient: Magdalene Read (83 y.o. male)  Date: 10/26/2022  Primary Diagnosis: Chest pain [R07.9]       Precautions: aspiration       ASSESSMENT :  Based on the objective data described below, the patient presents with moderate oral, oropharyngeal and pharyngeal dysphagia c/b weakness and swallow delay. Silvio aspiration w/ thin via straw observed. Oral phase c/b prolonged oral transit. Intermittent bolus holding w/ pudding trial. Lingual pumping. Premature spillage over BOT to the level of the pyriforms. Reduced TBR. Delay in the initiation of swallow of 5+ seconds. Patient unable to mastication solid trial and expectorated. Pharyngeal phase c/b reduced hyolaryngeal excursion and protraction. Epiglottic inversion reduced and slow to invert. Delayed recoil. Trace PPW residue. Intermittent vallecular and pyriform residue that clears w/ subsequent swallows. There is silvio silent aspiration w/ thin via straw. Patient is cued to elicit cough response, however cough weak. There is no penetration or aspiration observed w/ thin via cup and tsp. No penetration or aspiration observed w/ mildly thick, moderately thick and pudding trials. UES appears WFL. There is trace residue below this segment after the swallow. Of note: patient w/ dry heaving, coughing and drooling after initial thin trial via tsp and cup. No barium was noted in trachea, no significant pharyngeal residue or pharyngoesophageal retrograde flow noted. When prompted if patient felt \" sick to his stomach\" patient noted head  \"yes. \"       Patient will benefit from skilled intervention to address the above impairments. Patients rehabilitation potential is considered to be Fair     PLAN :  Recommendations and Planned Interventions:  Puree diet, mildly thick liquids. Medications crushed. NO STRAWS  Aspiration precautions. Assist w/ feeding.    Patient would benefit from ST services s/p discharge to target dysphagia. Frequency/Duration: Patient will be followed by speech-language pathology 3 times a week to address goals. Discharge Recommendations: To Be Determined     SUBJECTIVE:   Patient alert and agreeable to MBS. OBJECTIVE:     Past Medical History:   Diagnosis Date    Cancer ([address]     Chronic kidney disease     Dialysis patient [facility])     Hypertension     Stroke [facility])    History reviewed. No pertinent surgical history. CXR Results  (Last 48 hours)      None              Video Flouroscopic Procedures  [x] Lateral View   [] A-P View [] Scanned to level of Sternum    [] Seated at 90 deg.   [] Other:    Presentation:   [x] Spoon   [x] Cup   [] Straw   [x] Syringe   [x] Consecutive Swallows  [] Other:    Consistencies:   [x] Ba+ liquid   [x] Ba+ liquid (nectar)   [x] Ba+ liquid (honey)     [x] Ba+ pudding   [] Ba+ crunched cookie   [] Ba+ cookie   [] Other:     Results  Dysphagia Present:    [x] Yes  [] No    Ratings of Dysphagia:    [] Mild  [x] Moderate [] Severe    Stages of Breakdown:   [x] Oral      [x] Oral Preparatory [x] Pharyngeal   [] Esophageal    Aspiration: [x] Yes    [] No  [] At Risk  [] Trace (<10%) [x] Significant (>10%):     %  [] Penetration: Level of:   Cough: [] Yes      [] No    Consistency Aspirated:  [x] Thin Liquid   [] Nectar   [] Honey   [] Pureed     [] Mech-soft  [] Solid    Motility Problems with:  [] Lip Closure:   [] Sucking:   [x] Mastication:   [x] Bolus Formation:   [x] Bolus Control:  [x] A-P Transport:  [x] Posterior Tongue Elevation:  [x] Swallow Response (delayed):  [] Velopharyngeal Closure:  [x] Laryngeal Elevation:  [] Laryngeal Adduction:  [] Cricopharyngeal Relaxation:  [] Esophageal Peristalsis:  [] Reflux:  [] Other:    Timing of Aspiration:  [x] Before Swallow:  [] During Swallow:  [] After Swallow:    Transit Time Delay:  [x] >1 Second  Oral  [x] >1 Second Pharyngeal  [] >20 Second Esophageal     Residuals:  [] Buccal Cavity   [x] Velum/posterior pharyngeal wall  [x] Valleculae  [x] Pyriforms    Pain:    8-point Penetration-Aspiration Scale Score:8  Clinical Judgement  COMMUNICATION/EDUCATION:   Patient receptive of education regarding MBS results, aspiration and diet modifications. his   demonstrated Fair understanding as evidenced by nonverbal response ( head nod ). The patients plan of care including findings from [facility], recommendations, planned interventions, and recommended diet changes were discussed with: Physician. Patient/family have participated as able in goal setting and plan of care. Thank you for this referral.   Yifan Domingo M.S. CCC-SLP     Problem: Dysphagia (Adult)  Goal: *Acute Goals and Plan of Care (Insert Text)  Description: Speech Therapy Swallow Goals  Initiated 10/24/2022  -Patient will tolerate puree diet with honey thick liquids without clinical indicators of aspiration given moderate cues within 5-7day(s). -Patient will tolerate PO trials without clinical indicators of aspiration given moderate cues within 5-7day(s). -Patient will participate in modified barium swallow study within 5-7 day(s). -Patient will demonstrate understanding of swallow safety precautions and aspiration precautions, diet recs with moderate cues within 5-7 day(s).     -Patient/caregiver goal: \" to eat safely\"          Outcome: Progressing Towards Goal

## 2022-10-26 NOTE — PROGRESS NOTES
SPEECH LANGUAGE PATHOLOGY DYSPHAGIA TREATMENT  Patient: Ciaran Winston (40 y.o. male)  Date: 10/25/2022  Diagnosis: Chest pain [R07.9] <principal problem not specified>      Precautions: aspiration      ASSESSMENT:  Patient seen for follow-up. Patient reports no difficulty tolerating current diet during meals this date. Patient attempts to reposition self upright in bed but requires assistance. Weak vocal quality noted. RD now following. Self-administered trials of thin liquids and puree. Global weakness appreciated. Oral phase c/b adequate bolus manipulation and control, delay in a-p transit. Pharyngeal phase c/b min-mild delay in initiation of swallow and, once initiated, HLE reduced upon palpation. No clinical indicators of aspiration/penetration observed. PLAN:  Recommendations and Planned Interventions:  Recommend continue puree diet with moderately thick liquids. STRICT aspiration precautions. Recommend MBS to further assess swallow function and rule out aspiration. Patient continues to benefit from skilled intervention to address the above impairments. Continue treatment per established plan of care. Discharge Recommendations: To Be Determined     SUBJECTIVE:   Patient alert, oriented x2, agreeable to tx.     OBJECTIVE:     CXR Results  (Last 48 hours)      None          CT Results  (Last 48 hours)      None           Cognitive and Communication Status:  Neurologic State: Alert, Eyes open spontaneously  Orientation Level: Oriented to person, Oriented to place, Disoriented to situation, Disoriented to time  Cognition: Follows commands, Poor safety awareness, Appropriate for age attention/concentration                        Pain:  Pain Scale 1: Numeric (0 - 10)  Pain Intensity 1: 0       After treatment:   Patient left in no apparent distress in bed, Call bell within reach, and Nursing notified    COMMUNICATION/EDUCATION:   Patient was educated regarding his deficit(s) of dysphagia, swallow safety precautions, diet recs and POC. He demonstrated Good understanding as evidenced by engagement, acknowledged understanding. The patient's plan of care including recommendations, planned interventions, and recommended diet changes were discussed with: Registered nurse. Dulce Bosch M.S. CCC-SLP               Problem: Dysphagia (Adult)  Goal: *Acute Goals and Plan of Care (Insert Text)  Description: Speech Therapy Swallow Goals  Initiated 10/24/2022  -Patient will tolerate puree diet with honey thick liquids without clinical indicators of aspiration given moderate cues within 5-7day(s). -Patient will tolerate PO trials without clinical indicators of aspiration given moderate cues within 5-7day(s). -Patient will participate in modified barium swallow study within 5-7 day(s). -Patient will demonstrate understanding of swallow safety precautions and aspiration precautions, diet recs with moderate cues within 5-7 day(s).     -Patient/caregiver goal: \" to eat safely\"          Outcome: Progressing Towards Goal

## 2022-10-27 VITALS
OXYGEN SATURATION: 99 % | RESPIRATION RATE: 16 BRPM | BODY MASS INDEX: 16.56 KG/M2 | WEIGHT: 111.77 LBS | DIASTOLIC BLOOD PRESSURE: 70 MMHG | HEART RATE: 55 BPM | HEIGHT: 69 IN | SYSTOLIC BLOOD PRESSURE: 117 MMHG | TEMPERATURE: 97.5 F

## 2022-10-27 LAB
ALBUMIN SERPL-MCNC: 2.6 G/DL (ref 3.5–5)
ANION GAP SERPL CALC-SCNC: 11 MMOL/L (ref 5–15)
BUN SERPL-MCNC: 49 MG/DL (ref 6–20)
BUN/CREAT SERPL: 10 (ref 12–20)
CA-I BLD-MCNC: 10.5 MG/DL (ref 8.5–10.1)
CHLORIDE SERPL-SCNC: 99 MMOL/L (ref 97–108)
CO2 SERPL-SCNC: 27 MMOL/L (ref 21–32)
CREAT SERPL-MCNC: 4.78 MG/DL (ref 0.7–1.3)
ECHO AO ROOT DIAM: 3.4 CM
ECHO AO ROOT INDEX: 2.11 CM/M2
ECHO AV AREA PEAK VELOCITY: 1.9 CM2
ECHO AV AREA VTI: 1.7 CM2
ECHO AV AREA/BSA PEAK VELOCITY: 1.2 CM2/M2
ECHO AV AREA/BSA VTI: 1.1 CM2/M2
ECHO AV MEAN GRADIENT: 5 MMHG
ECHO AV MEAN VELOCITY: 1 M/S
ECHO AV PEAK GRADIENT: 11 MMHG
ECHO AV PEAK VELOCITY: 1.7 M/S
ECHO AV VELOCITY RATIO: 0.76
ECHO AV VTI: 27.2 CM
ECHO EST RA PRESSURE: 3 MMHG
ECHO LA AREA 2C: 8.7 CM2
ECHO LA AREA 4C: 15.7 CM2
ECHO LA DIAMETER INDEX: 2.17 CM/M2
ECHO LA DIAMETER: 3.5 CM
ECHO LA MAJOR AXIS: 4.4 CM
ECHO LA MINOR AXIS: 3.9 CM
ECHO LA TO AORTIC ROOT RATIO: 1.03
ECHO LA VOL 4C: 41 ML (ref 18–58)
ECHO LA VOL BP: 25 ML (ref 18–58)
ECHO LA VOL/BSA BIPLANE: 16 ML/M2 (ref 16–34)
ECHO LA VOLUME INDEX A4C: 25 ML/M2 (ref 16–34)
ECHO LV E' LATERAL VELOCITY: 7 CM/S
ECHO LV E' SEPTAL VELOCITY: 6 CM/S
ECHO LV EDV A2C: 29 ML
ECHO LV EDV A4C: 93 ML
ECHO LV EDV INDEX A4C: 58 ML/M2
ECHO LV EDV NDEX A2C: 18 ML/M2
ECHO LV EJECTION FRACTION A2C: 67 %
ECHO LV EJECTION FRACTION A4C: 58 %
ECHO LV ESV A2C: 10 ML
ECHO LV ESV A4C: 39 ML
ECHO LV ESV INDEX A2C: 6 ML/M2
ECHO LV ESV INDEX A4C: 24 ML/M2
ECHO LV FRACTIONAL SHORTENING: 32 % (ref 28–44)
ECHO LV INTERNAL DIMENSION DIASTOLE INDEX: 2.55 CM/M2
ECHO LV INTERNAL DIMENSION DIASTOLIC: 4.1 CM (ref 4.2–5.9)
ECHO LV INTERNAL DIMENSION SYSTOLIC INDEX: 1.74 CM/M2
ECHO LV INTERNAL DIMENSION SYSTOLIC: 2.8 CM
ECHO LV IVSD: 1.3 CM (ref 0.6–1)
ECHO LV MASS 2D: 193.5 G (ref 88–224)
ECHO LV MASS INDEX 2D: 120.2 G/M2 (ref 49–115)
ECHO LV POSTERIOR WALL DIASTOLIC: 1.3 CM (ref 0.6–1)
ECHO LV RELATIVE WALL THICKNESS RATIO: 0.63
ECHO LVOT AREA: 2.5 CM2
ECHO LVOT AV VTI INDEX: 0.67
ECHO LVOT DIAM: 1.8 CM
ECHO LVOT MEAN GRADIENT: 3 MMHG
ECHO LVOT PEAK GRADIENT: 7 MMHG
ECHO LVOT PEAK VELOCITY: 1.3 M/S
ECHO LVOT STROKE VOLUME INDEX: 28.8 ML/M2
ECHO LVOT SV: 46.3 ML
ECHO LVOT VTI: 18.2 CM
ECHO MV A VELOCITY: 0.85 M/S
ECHO MV AREA VTI: 2.4 CM2
ECHO MV E VELOCITY: 0.55 M/S
ECHO MV E/A RATIO: 0.65
ECHO MV E/E' LATERAL: 7.86
ECHO MV E/E' RATIO (AVERAGED): 8.51
ECHO MV E/E' SEPTAL: 9.17
ECHO MV LVOT VTI INDEX: 1.08
ECHO MV MAX VELOCITY: 0.9 M/S
ECHO MV MEAN GRADIENT: 1 MMHG
ECHO MV MEAN VELOCITY: 0.4 M/S
ECHO MV PEAK GRADIENT: 3 MMHG
ECHO MV REGURGITANT PEAK GRADIENT: 25 MMHG
ECHO MV REGURGITANT PEAK VELOCITY: 2.5 M/S
ECHO MV REGURGITANT VTIA: 70.4 CM
ECHO MV VTI: 19.6 CM
ECHO RA AREA 4C: 13.2 CM2
ECHO RA END SYSTOLIC VOLUME APICAL 4 CHAMBER INDEX BSA: 21 ML/M2
ECHO RA VOLUME BIPLANE METHOD OF DISKS: 34 ML
ECHO RA VOLUME INDEX BP: 21 ML/M2
ECHO RA VOLUME: 34 ML
ECHO RIGHT VENTRICULAR SYSTOLIC PRESSURE (RVSP): 23 MMHG
ECHO RV BASAL DIMENSION: 4.2 CM
ECHO RV MID DIMENSION: 2.8 CM
ECHO RV TAPSE: 3.6 CM (ref 1.7–?)
ECHO TV REGURGITANT MAX VELOCITY: 2.25 M/S
ECHO TV REGURGITANT PEAK GRADIENT: 20 MMHG
GLUCOSE SERPL-MCNC: 121 MG/DL (ref 65–100)
PHOSPHATE SERPL-MCNC: 5.9 MG/DL (ref 2.6–4.7)
POTASSIUM SERPL-SCNC: 4.7 MMOL/L (ref 3.5–5.1)
SODIUM SERPL-SCNC: 137 MMOL/L (ref 136–145)

## 2022-10-27 PROCEDURE — 80069 RENAL FUNCTION PANEL: CPT

## 2022-10-27 PROCEDURE — 74011250637 HC RX REV CODE- 250/637: Performed by: INTERNAL MEDICINE

## 2022-10-27 PROCEDURE — 94640 AIRWAY INHALATION TREATMENT: CPT

## 2022-10-27 PROCEDURE — 83970 ASSAY OF PARATHORMONE: CPT

## 2022-10-27 PROCEDURE — 74011250637 HC RX REV CODE- 250/637: Performed by: FAMILY MEDICINE

## 2022-10-27 PROCEDURE — 74011250636 HC RX REV CODE- 250/636: Performed by: FAMILY MEDICINE

## 2022-10-27 PROCEDURE — 36415 COLL VENOUS BLD VENIPUNCTURE: CPT

## 2022-10-27 RX ADMIN — SEVELAMER CARBONATE 800 MG: 800 TABLET, FILM COATED ORAL at 13:25

## 2022-10-27 RX ADMIN — METOPROLOL SUCCINATE 50 MG: 50 TABLET, EXTENDED RELEASE ORAL at 08:16

## 2022-10-27 RX ADMIN — TIOTROPIUM BROMIDE AND OLODATEROL 2 PUFF: 3.124; 2.736 SPRAY, METERED RESPIRATORY (INHALATION) at 07:37

## 2022-10-27 RX ADMIN — HYDRALAZINE HYDROCHLORIDE 50 MG: 50 TABLET, FILM COATED ORAL at 08:16

## 2022-10-27 RX ADMIN — HEPARIN SODIUM 5000 UNITS: 5000 INJECTION INTRAVENOUS; SUBCUTANEOUS at 05:47

## 2022-10-27 RX ADMIN — ATORVASTATIN CALCIUM 40 MG: 40 TABLET, FILM COATED ORAL at 08:16

## 2022-10-27 RX ADMIN — ASPIRIN 81 MG CHEWABLE TABLET 81 MG: 81 TABLET CHEWABLE at 08:16

## 2022-10-27 RX ADMIN — DILTIAZEM HYDROCHLORIDE 240 MG: 120 CAPSULE, COATED, EXTENDED RELEASE ORAL at 08:16

## 2022-10-27 RX ADMIN — SEVELAMER CARBONATE 800 MG: 800 TABLET, FILM COATED ORAL at 08:16

## 2022-10-27 RX ADMIN — HEPARIN SODIUM 5000 UNITS: 5000 INJECTION INTRAVENOUS; SUBCUTANEOUS at 13:25

## 2022-10-27 RX ADMIN — TAMSULOSIN HYDROCHLORIDE 0.4 MG: 0.4 CAPSULE ORAL at 08:16

## 2022-10-27 NOTE — PROGRESS NOTES
Renal Progress Note    Patient: Josie London MRN: [id number]  SSN: [id number]    YOB: 1950  Age: 81 y.o. Sex: male      Admit Date: 10/23/2022    LOS: 3 days     Subjective:   Patient seen at bedside. Alert and awake, AMS, no acute distress. no edema no sob  Eating with assistance      Current Facility-Administered Medications   Medication Dose Route Frequency    metoprolol succinate (TOPROL-XL) XL tablet 50 mg  50 mg Oral DAILY    dilTIAZem ER (CARDIZEM CD) capsule 240 mg  240 mg Oral DAILY    nitroglycerin (NITROSTAT) tablet 0.4 mg  0.4 mg SubLINGual PRN    aspirin chewable tablet 81 mg  81 mg Oral DAILY    atorvastatin (LIPITOR) tablet 40 mg  40 mg Oral DAILY    nitroglycerin (NITROBID) 2 % ointment 1 Inch  1 Inch Topical Q6H PRN    hydrALAZINE (APRESOLINE) tablet 50 mg  50 mg Oral TID    heparin (porcine) injection 5,000 Units  5,000 Units SubCUTAneous Q8H    tamsulosin (FLOMAX) capsule 0.4 mg  0.4 mg Oral BID    latanoprost (XALATAN) 0.005 % ophthalmic solution 1 Drop  1 Drop Both Eyes QPM    tiotropium-olodateroL (STIOLTO RESPIMAT) 2.5-2.5 mcg/actuation inhaler 2 Puff  2 Puff Inhalation DAILY    sevelamer carbonate (RENVELA) tab 800 mg  800 mg Oral TID WITH MEALS        Vitals:    10/27/22 0355 10/27/22 0722 10/27/22 0734 10/27/22 1112   BP: 130/76 (!) 142/81  96/64   Pulse: 70 64  61   Resp: 16 18  18   Temp: 97.8 °F (36.6 °C) 97.4 °F (36.3 °C)  97.6 °F (36.4 °C)   TempSrc:       SpO2: 96% 98% 98% 98%   Weight: 50.7 kg (111 lb 12.4 oz)      Height:         Objective:   General: alert awake AMS, no acute distress. HEENT: EOMI, no Icterus, no Pallor, pupils reactive, mucosa dry normal inspection of ears and nose,   Neck: Neck is supple, No JVD, no thyromegaly. Lungs: good air entry+ no rhonchi, no rales,  CVS: heart sounds normal,  no murmurs, no rubs.   GI: soft, nontender, normal BS,   Extremeties: no clubbing, no cyanosis, no edema,  Neuro: Alert, awake, altered mental status, residual weakness from old CVA   Skin: normal skin turgor, no skin rashes       Intake and Output:  Current Shift: No intake/output data recorded. Last three shifts: 10/25 1901 - 10/27 0700  In: -   Out: 1000       Lab/Data Review:  No results for input(s): WBC, HGB, HCT, PLT, HGBEXT, HCTEXT, PLTEXT, HGBEXT, HCTEXT, PLTEXT in the last 72 hours. Recent Labs     10/27/22  0646 10/26/22  0710 10/25/22  1113    135* 136   K 4.7 5.0 4.8   CL 99 99 99   CO2 27 26 25   * 100 108*   BUN 49* 72* 61*   CREA 4.78* 6.46* 5.85*   CA 10.5* 11.0* 10.5*   PHOS 5.9* 6.1* 5.6*   ALB 2.6* 2.8* 3.0*     No results for input(s): PH, PCO2, PO2, HCO3, FIO2 in the last 72 hours. Recent Results (from the past 24 hour(s))   RENAL FUNCTION PANEL    Collection Time: 10/27/22  6:46 AM   Result Value Ref Range    Sodium 137 136 - 145 mmol/L    Potassium 4.7 3.5 - 5.1 mmol/L    Chloride 99 97 - 108 mmol/L    CO2 27 21 - 32 mmol/L    Anion gap 11 5 - 15 mmol/L    Glucose 121 (H) 65 - 100 mg/dL    BUN 49 (H) 6 - 20 mg/dL    Creatinine 4.78 (H) 0.70 - 1.30 mg/dL    BUN/Creatinine ratio 10 (L) 12 - 20      eGFR 12 (L) >60 ml/min/1.73m2    Calcium 10.5 (H) 8.5 - 10.1 mg/dL    Phosphorus 5.9 (H) 2.6 - 4.7 mg/dL    Albumin 2.6 (L) 3.5 - 5.0 g/dL        Assessment and Plan:     End-stage renal disease on hemodialysis: on MWF at [facility]  No fluid overload,   Will continue maintenance hemodialysis on Monday Wednesdays and Fridays      2. Hypertension: Stable  Continue current blood pressure medications and continue to monitor blood pressures     3. chest pain: Nonspecific, resolved now  May be related to lung lesions/large lung mass  Borderline troponin elevations+     4.   Hyperkalemia: Secondary to end-stage renal disease  resolved with hemodialysis      5, Anemia: Stable hemoglobin   Continue to monitor H&H     6. Renal osteodystrophy: High calcium level 11 probably with metastatic malignanacy  Check PTH tomorrow  used low calcium bath with hemodialysis today, repeat calcium tomorrow     7. Hyperphosphatemia: continue phosphate binders  Phos is 6.1 today, continue to monitor  Secondary hyperparathyroidism: repeAT pth LEVEL     8. Metastatic lung cancer: Sister Albert Ramirez is his legal guardian, she is aware of his advanced malignancy and awaiting to make a decision on comfort care.      Stable for DC    Signed By: Rika Fields MD     October 27, 2022

## 2022-10-27 NOTE — PROGRESS NOTES
Physician Progress Note      Vini Pat  CSN #:                  [id number]  :                       1950  ADMIT DATE:       10/23/2022 6:18 PM  [address] [place] DATE:  RESPONDING  PROVIDER #:        Berenice Guerra MD          QUERY TEXT:    Patient admitted with chest pain. If possible, please document in progress notes and discharge summary if you are evaluating and /or treating any of the following: The medical record reflects the following:  Risk Factors: 82 yo male with lung cancer with mets, ESRD, dementia  Clinical Indicators: BMI 16.3, Albumin 3.0;  Nutritional Consult: Severe malnutrition- 5% weight loss over 1 month (8.3%)  - Triceps, Orbital, Buccal region  Muscle Mass Loss:  Severe muscle mass loss, Clavicles (pectoralis &deltoids), Temples (temporalis), Scapula (trapezius)  Treatment: Add Ensure pudding x2/d    Thank you,  Elroy Monge RN, CCDS    ASPEN Criteria:  https://aspenjournals. onlinelibrary. rosenthal. com/doi/full/10.1177/7806731025938538  Options provided:  -- Protein calorie malnutrition mild  -- Protein calorie malnutrition moderate  -- Protein calorie malnutrition severe  -- Other - I will add my own diagnosis  -- Disagree - Not applicable / Not valid  -- Disagree - Clinically unable to determine / Unknown  -- Refer to Clinical Documentation Reviewer    PROVIDER RESPONSE TEXT:    This patient has moderate protein calorie malnutrition.     Query created by: Hafsa Styles on 10/25/2022 3:12 PM      Electronically signed by:  Berenice Guerra MD 10/27/2022 12:06 PM

## 2022-10-27 NOTE — PROGRESS NOTES
Pt has discharge orders home with hospice. Spoke with attending. Attending stated the pt is able to discharge today. Discharge plan of care/case management plan validated with provider discharge order.

## 2022-10-27 NOTE — DISCHARGE SUMMARY
Discharge Summary       PATIENT ID: Ciaran Winston  MRN: [id number]   YOB: 1950    DATE OF ADMISSION: 10/23/2022  6:18 PM    DATE OF DISCHARGE:   PRIMARY CARE PROVIDER: Nicolas Swenson MD     ATTENDING PHYSICIAN: Vicki Kimbrough  DISCHARGING PROVIDER: Vicki Kimbrough      CONSULTATIONS: IP CONSULT TO NEPHROLOGY  IP CONSULT TO CARDIOLOGY    PROCEDURES/SURGERIES: * No surgery found *    ADMITTING DIAGNOSES:    Patient Active Problem List    Diagnosis Date Noted    Severe protein-calorie malnutrition ([address] 10/25/2022    Chest pain 10/23/2022       DISCHARGE DIAGNOSES / PLAN:      Chest pain rule out MI  Hypertension  Chronic kidney disease on hemodialysis  History of CVA  Metastatic lung disease  Dementia        DISCHARGE MEDICATIONS:  Current Discharge Medication List        START taking these medications    Details   atorvastatin (LIPITOR) 40 mg tablet Take 1 Tablet by mouth daily. Qty: 60 Tablet, Refills: 0  Start date: 10/26/2022      dilTIAZem ER (CARDIZEM CD) 240 mg capsule Take 1 Capsule by mouth daily. Qty: 30 Capsule, Refills: 2  Start date: 10/26/2022      hydrALAZINE (APRESOLINE) 50 mg tablet Take 1 Tablet by mouth three (3) times daily. Qty: 60 Tablet, Refills: 0  Start date: 10/26/2022      metoprolol succinate (TOPROL-XL) 50 mg XL tablet Take 1 Tablet by mouth daily. Qty: 60 Tablet, Refills: 0  Start date: 10/26/2022      nitroglycerin (NITROSTAT) 0.4 mg SL tablet 1 Tablet by SubLINGual route as needed for Chest Pain. Up to 3 doses. Qty: 60 Tablet, Refills: 2  Start date: 10/26/2022      sevelamer carbonate (RENVELA) 800 mg tab tab Take 1 Tablet by mouth three (3) times daily (with meals). Qty: 60 Tablet, Refills: 0  Start date: 10/26/2022      tiotropium-olodateroL (STIOLTO RESPIMAT) 2.5-2.5 mcg/actuation inhaler Take 2 Puffs by inhalation daily.   Qty: 4 g, Refills: 2  Start date: 10/27/2022           CONTINUE these medications which have NOT CHANGED    Details   tamsulosin (FLOMAX) 0.4 mg capsule Take 0.4 mg by mouth two (2) times a day. aspirin 81 mg chewable tablet Take 81 mg by mouth daily. latanoprost (XALATAN) 0.005 % ophthalmic solution Administer 1 Drop to both eyes nightly. STOP taking these medications       amLODIPine (NORVASC) 10 mg tablet Comments:   Reason for Stopping:         sevelamer (RENAGEL) 400 mg tablet Comments:   Reason for Stopping:                 NOTIFY YOUR PHYSICIAN FOR ANY OF THE FOLLOWING:   Fever over 101 degrees for 24 hours. Chest pain, shortness of breath, fever, chills, nausea, vomiting, diarrhea, change in mentation, falling, weakness, bleeding. Severe pain or pain not relieved by medications. Or, any other signs or symptoms that you may have questions about. DISPOSITION:  x  Home With:   OT  PT  HH  RN       Long term SNF/Inpatient Rehab    Independent/assisted living    Hospice    Other:       PATIENT CONDITION AT DISCHARGE: Stable      PHYSICAL EXAMINATION AT DISCHARGE:  General:          Alert, cooperative, no distress, appears stated age. HEENT:           Atraumatic, anicteric sclerae, pink conjunctivae                          No oral ulcers, mucosa moist, throat clear, dentition fair  Neck:               Supple, symmetrical  Lungs:             Clear to auscultation bilaterally. No Wheezing or Rhonchi. No rales. Chest wall:      No tenderness  No Accessory muscle use. Heart:              Regular  rhythm,  No  murmur   No edema  Abdomen:        Soft, non-tender. Not distended. Bowel sounds normal  Extremities:     No cyanosis. No clubbing,                            Skin turgor normal, Capillary refill normal  Skin:                Not pale. Not Jaundiced  No rashes   Psych:             Not anxious or agitated.   Neurologic:      Alert, moves all extremities, answers questions appropriately and responds to commands     XR SWALLOW FUNC VIDEO   Final Result   Robert aspiration and penetration using thin consistency with straw. Weak cough   reflex. Refer to speech pathology report for additional findings. Air kerma (radiation dose): 8.92 mGy         XR CHEST PORT   Final Result   1. Left-sided mass is unchanged   2.  Right basilar atelectasis           Recent Results (from the past 24 hour(s))   ECHO ADULT COMPLETE    Collection Time: 10/26/22 12:57 PM   Result Value Ref Range    LV EDV A2C 29 mL    LV EDV A4C 93 mL    LV ESV A2C 10 mL    LV ESV A4C 39 mL    IVSd 1.3 0.6 - 1.0 cm    LVIDd 4.1 4.2 - 5.9 cm    LVIDs 2.8 cm    LVOT Diameter 1.8 cm    LVOT Mean Gradient 3 mmHg    LVOT VTI 18.2 cm    LVOT Peak Velocity 1.3 m/s    LVOT Peak Gradient 7 mmHg    LVPWd 1.3 0.6 - 1.0 cm    LV E' Lateral Velocity 7 cm/s    LV E' Septal Velocity 6 cm/s    LV Ejection Fraction A2C 67 %    LV Ejection Fraction A4C 58 %    LVOT Area 2.5 cm2    LVOT SV 46.3 ml    LA Minor Axis 3.9 cm    LA Major [place] 4.4 cm    LA Area 2C 8.7 cm2    LA Area 4C 15.7 cm2    LA Volume BP 25 18 - 58 mL    LA Diameter 3.5 cm    RA Area 4C 13.2 cm2    RA Volume 34 ml    AV Mean Gradient 5 mmHg    AV VTI 27.2 cm    AV Mean Velocity 1.0 m/s    AV Peak Velocity 1.7 m/s    AV Peak Gradient 11 mmHg    AV Area by VTI 1.7 cm2    AV Area by Peak Velocity 1.9 cm2    Aortic Root 3.4 cm    MR VTI 70.4 cm    MV Mean Gradient 1 mmHg    MV VTI 19.6 cm    MV Mean Velocity 0.4 m/s    MR Peak Velocity 2.5 m/s    MR Peak Gradient 25 mmHg    MV Max Velocity 0.9 m/s    MV Peak Gradient 3 mmHg    MV A Velocity 0.85 m/s    MV E Velocity 0.55 m/s    MV Area by VTI 2.4 cm2    RV Basal Dimension 4.2 cm    RV Mid Dimension 2.8 cm    TAPSE 3.6 1.7 cm    TR Max Velocity 2.25 m/s    TR Peak Gradient 20 mmHg    Fractional Shortening 2D 32 28 - 44 %    LV ESV Index A4C 24 mL/m2    LV EDV Index A4C 58 mL/m2    LV ESV Index A2C 6 mL/m2    LV EDV Index A2C 18 mL/m2    LVIDd Index 2.55 cm/m2    LVIDs Index 1.74 cm/m2    LV RWT Ratio 0.63     LV Mass 2D 193.5 88 - 224 g    LV Mass 2D Index 120.2 49 - 115 g/m2    MV E/A 0.65     E/E' Ratio (Averaged) 8.51     E/E' Lateral 7.86     E/E' Septal 9.17     LA Volume Index BP 16 16 - 34 ml/m2    LVOT Stroke Volume Index 28.8 mL/m2    LA Size Index 2.17 cm/m2    LA/AO Root Ratio 1.03     RA Volume Index A4C 21 mL/m2    Ao Root Index 2.11 cm/m2    AV Velocity Ratio 0.76     LVOT:AV VTI Index 0.67     CAROLA/BSA VTI 1.1 cm2/m2    CAROLA/BSA Peak Velocity 1.2 cm2/m2    MV:LVOT VTI Index 1.08     Est. RA Pressure 3 mmHg    RVSP 23 mmHg    LA Volume 4C 41 18 - 58 mL    LA Volume Index 4C 25 16 - 34 mL/m2    RA Volume BP 34 mL    RA Volume Index BP 21 mL/m2   RENAL FUNCTION PANEL    Collection Time: 10/27/22  6:46 AM   Result Value Ref Range    Sodium 137 136 - 145 mmol/L    Potassium 4.7 3.5 - 5.1 mmol/L    Chloride 99 97 - 108 mmol/L    CO2 27 21 - 32 mmol/L    Anion gap 11 5 - 15 mmol/L    Glucose 121 (H) 65 - 100 mg/dL    BUN 49 (H) 6 - 20 mg/dL    Creatinine 4.78 (H) 0.70 - 1.30 mg/dL    BUN/Creatinine ratio 10 (L) 12 - 20      eGFR 12 (L) >60 ml/min/1.73m2    Calcium 10.5 (H) 8.5 - 10.1 mg/dL    Phosphorus 5.9 (H) 2.6 - 4.7 mg/dL    Albumin 2.6 (L) 3.5 - 5.0 g/dL          HOSPITAL COURSE:    Patient is a 81y.o. year old male with signal past medical history of end-stage renal disease on hemodialysis CVA hypertension metastatic lung cancer came to emergency room complaining of chest pain seen by the ER physician  Patient not a good historian but denies of any radiation no fever no chills or diaphoresis no palpitation generalized weakness with the seen by the ER physician and recommended patient to be admitted for acute chest pain     10/25  Pt is alert and awake, resting comfortably in bed. He responds minimally to questions. Pt is a poor historian. Denies chest pain, dyspnea, fever, chills, n/v, changes in bladder or bowel habits. Seen by nephrology yesterday. Pt has ESRD on hemodialysis MWF at [facility]. No fluid overload complaints.       XR Chest from 10/23  Left-sided mass is unchanged  Right basilar atelectasis     CT Chest WO Cont from 10/07  Previously noted possible mass in left upper lobe orresponds to a 3.8 x 4.1 cm mass. There are multiple other nodular masses. Findings are suggestive of metastatic disease. The left upper lobe mass may represent a primary or metastasis as well. Multiple liver masses are noted with perihepatic ascites. Large mass infiltrating most of the right hepatic lobe. Questionable biliary dilatation.  Further assessment       Patient was seen by the nephrology and also seen by the cardiology during this admission    Cardiology adjusting medication add beta-blocker and Cardizem also seen by nephrology regarding dialysis patient have metastatic lung disease plan for discharge home with hospice    Discussed with the     Medication reconciliation done time shopping 35 minutes 50% time spent counseling and coordination of care    Patient scheduled to discharge home with hospice today    Signed:   Parnell Opitz, MD  10/27/2022  11:03 AM

## 2022-10-27 NOTE — DISCHARGE SUMMARY
Discharge Summary       PATIENT ID: Supriya Terrazas  MRN: [id number]   YOB: 1950    DATE OF ADMISSION: 10/23/2022  6:18 PM    DATE OF DISCHARGE:   PRIMARY CARE PROVIDER: Samantha Mayberry MD     ATTENDING PHYSICIAN: Julee Kimbrough  DISCHARGING PROVIDER: Julee Kimbrough      CONSULTATIONS: IP CONSULT TO NEPHROLOGY  IP CONSULT TO CARDIOLOGY    PROCEDURES/SURGERIES: * No surgery found *    ADMITTING DIAGNOSES:    Patient Active Problem List    Diagnosis Date Noted    Severe protein-calorie malnutrition ([facility] [address] 10/25/2022    Chest pain 10/23/2022       DISCHARGE DIAGNOSES / PLAN:      Chest pain rule out MI  Hypertension  Chronic kidney disease on hemodialysis  History of CVA  Metastatic lung disease  Dementia        DISCHARGE MEDICATIONS:  Current Discharge Medication List        START taking these medications    Details   atorvastatin (LIPITOR) 40 mg tablet Take 1 Tablet by mouth daily. Qty: 60 Tablet, Refills: 0  Start date: 10/26/2022      dilTIAZem ER (CARDIZEM CD) 240 mg capsule Take 1 Capsule by mouth daily. Qty: 30 Capsule, Refills: 2  Start date: 10/26/2022      hydrALAZINE (APRESOLINE) 50 mg tablet Take 1 Tablet by mouth three (3) times daily. Qty: 60 Tablet, Refills: 0  Start date: 10/26/2022      metoprolol succinate (TOPROL-XL) 50 mg XL tablet Take 1 Tablet by mouth daily. Qty: 60 Tablet, Refills: 0  Start date: 10/26/2022      nitroglycerin (NITROSTAT) 0.4 mg SL tablet 1 Tablet by SubLINGual route as needed for Chest Pain. Up to 3 doses. Qty: 60 Tablet, Refills: 2  Start date: 10/26/2022      sevelamer carbonate (RENVELA) 800 mg tab tab Take 1 Tablet by mouth three (3) times daily (with meals). Qty: 60 Tablet, Refills: 0  Start date: 10/26/2022      tiotropium-olodateroL (STIOLTO RESPIMAT) 2.5-2.5 mcg/actuation inhaler Take 2 Puffs by inhalation daily.   Qty: 4 g, Refills: 2  Start date: 10/27/2022           CONTINUE these medications which have NOT CHANGED    Details   tamsulosin (FLOMAX) 0.4 mg capsule Take 0.4 mg by mouth two (2) times a day. aspirin 81 mg chewable tablet Take 81 mg by mouth daily. latanoprost (XALATAN) 0.005 % ophthalmic solution Administer 1 Drop to both eyes nightly. STOP taking these medications       amLODIPine (NORVASC) 10 mg tablet Comments:   Reason for Stopping:         sevelamer (RENAGEL) 400 mg tablet Comments:   Reason for Stopping:                 NOTIFY YOUR PHYSICIAN FOR ANY OF THE FOLLOWING:   Fever over 101 degrees for 24 hours. Chest pain, shortness of breath, fever, chills, nausea, vomiting, diarrhea, change in mentation, falling, weakness, bleeding. Severe pain or pain not relieved by medications. Or, any other signs or symptoms that you may have questions about. DISPOSITION:  x  Home With:   OT  PT  HH  RN       Long term SNF/Inpatient Rehab    Independent/assisted living    Hospice    Other:       PATIENT CONDITION AT DISCHARGE: Stable      PHYSICAL EXAMINATION AT DISCHARGE:  General:          Alert, cooperative, no distress, appears stated age. HEENT:           Atraumatic, anicteric sclerae, pink conjunctivae                          No oral ulcers, mucosa moist, throat clear, dentition fair  Neck:               Supple, symmetrical  Lungs:             Clear to auscultation bilaterally. No Wheezing or Rhonchi. No rales. Chest wall:      No tenderness  No Accessory muscle use. Heart:              Regular  rhythm,  No  murmur   No edema  Abdomen:        Soft, non-tender. Not distended. Bowel sounds normal  Extremities:     No cyanosis. No clubbing,                            Skin turgor normal, Capillary refill normal  Skin:                Not pale. Not Jaundiced  No rashes   Psych:             Not anxious or agitated.   Neurologic:      Alert, moves all extremities, answers questions appropriately and responds to commands     XR SWALLOW FUNC VIDEO   Final Result   Robert aspiration and penetration using thin consistency with straw. Weak cough   reflex. Refer to speech pathology report for additional findings. Air kerma (radiation dose): 8.92 mGy         XR CHEST PORT   Final Result   1. Left-sided mass is unchanged   2.  Right basilar atelectasis           Recent Results (from the past 24 hour(s))   ECHO ADULT COMPLETE    Collection Time: 10/26/22 12:57 PM   Result Value Ref Range    LV EDV A2C 29 mL    LV EDV A4C 93 mL    LV ESV A2C 10 mL    LV ESV A4C 39 mL    IVSd 1.3 0.6 - 1.0 cm    LVIDd 4.1 4.2 - 5.9 cm    LVIDs 2.8 cm    LVOT Diameter 1.8 cm    LVOT Mean Gradient 3 mmHg    LVOT VTI 18.2 cm    LVOT Peak Velocity 1.3 m/s    LVOT Peak Gradient 7 mmHg    LVPWd 1.3 0.6 - 1.0 cm    LV E' Lateral Velocity 7 cm/s    LV E' Septal Velocity 6 cm/s    LV Ejection Fraction A2C 67 %    LV Ejection Fraction A4C 58 %    LVOT Area 2.5 cm2    LVOT SV 46.3 ml    LA Minor Axis 3.9 cm    LA Major Effie 4.4 cm    LA Area 2C 8.7 cm2    LA Area 4C 15.7 cm2    LA Volume BP 25 18 - 58 mL    LA Diameter 3.5 cm    RA Area 4C 13.2 cm2    RA Volume 34 ml    AV Mean Gradient 5 mmHg    AV VTI 27.2 cm    AV Mean Velocity 1.0 m/s    AV Peak Velocity 1.7 m/s    AV Peak Gradient 11 mmHg    AV Area by VTI 1.7 cm2    AV Area by Peak Velocity 1.9 cm2    Aortic Root 3.4 cm    MR VTI 70.4 cm    MV Mean Gradient 1 mmHg    MV VTI 19.6 cm    MV Mean Velocity 0.4 m/s    MR Peak Velocity 2.5 m/s    MR Peak Gradient 25 mmHg    MV Max Velocity 0.9 m/s    MV Peak Gradient 3 mmHg    MV A Velocity 0.85 m/s    MV E Velocity 0.55 m/s    MV Area by VTI 2.4 cm2    RV Basal Dimension 4.2 cm    RV Mid Dimension 2.8 cm    TAPSE 3.6 1.7 cm    TR Max Velocity 2.25 m/s    TR Peak Gradient 20 mmHg    Fractional Shortening 2D 32 28 - 44 %    LV ESV Index A4C 24 mL/m2    LV EDV Index A4C 58 mL/m2    LV ESV Index A2C 6 mL/m2    LV EDV Index A2C 18 mL/m2    LVIDd Index 2.55 cm/m2    LVIDs Index 1.74 cm/m2    LV RWT Ratio 0.63     LV Mass 2D 193.5 88 - 224 g    LV Mass 2D Index 120.2 49 - 115 g/m2    MV E/A 0.65     E/E' Ratio (Averaged) 8.51     E/E' Lateral 7.86     E/E' Septal 9.17     LA Volume Index BP 16 16 - 34 ml/m2    LVOT Stroke Volume Index 28.8 mL/m2    LA Size Index 2.17 cm/m2    LA/AO Root Ratio 1.03     RA Volume Index A4C 21 mL/m2    Ao Root Index 2.11 cm/m2    AV Velocity Ratio 0.76     LVOT:AV VTI Index 0.67     CAROLA/BSA VTI 1.1 cm2/m2    CAROLA/BSA Peak Velocity 1.2 cm2/m2    MV:LVOT VTI Index 1.08     Est. RA Pressure 3 mmHg    RVSP 23 mmHg    LA Volume 4C 41 18 - 58 mL    LA Volume Index 4C 25 16 - 34 mL/m2    RA Volume BP 34 mL    RA Volume Index BP 21 mL/m2   RENAL FUNCTION PANEL    Collection Time: 10/27/22  6:46 AM   Result Value Ref Range    Sodium 137 136 - 145 mmol/L    Potassium 4.7 3.5 - 5.1 mmol/L    Chloride 99 97 - 108 mmol/L    CO2 27 21 - 32 mmol/L    Anion gap 11 5 - 15 mmol/L    Glucose 121 (H) 65 - 100 mg/dL    BUN 49 (H) 6 - 20 mg/dL    Creatinine 4.78 (H) 0.70 - 1.30 mg/dL    BUN/Creatinine ratio 10 (L) 12 - 20      eGFR 12 (L) >60 ml/min/1.73m2    Calcium 10.5 (H) 8.5 - 10.1 mg/dL    Phosphorus 5.9 (H) 2.6 - 4.7 mg/dL    Albumin 2.6 (L) 3.5 - 5.0 g/dL          HOSPITAL COURSE:    Patient is a 81y.o. year old male with signal past medical history of end-stage renal disease on hemodialysis CVA hypertension metastatic lung cancer came to emergency room complaining of chest pain seen by the ER physician  Patient not a good historian but denies of any radiation no fever no chills or diaphoresis no palpitation generalized weakness with the seen by the ER physician and recommended patient to be admitted for acute chest pain     10/25  Pt is alert and awake, resting comfortably in bed. He responds minimally to questions. Pt is a poor historian. Denies chest pain, dyspnea, fever, chills, n/v, changes in bladder or bowel habits. Seen by nephrology yesterday. Pt has ESRD on hemodialysis MWF at [facility]. No fluid overload complaints.       XR Chest from 10/23  Left-sided mass is unchanged  Right basilar atelectasis     CT Chest WO Cont from 10/07  Previously noted possible mass in left upper lobe orresponds to a 3.8 x 4.1 cm mass. There are multiple other nodular masses. Findings are suggestive of metastatic disease. The left upper lobe mass may represent a primary or metastasis as well. Multiple liver masses are noted with perihepatic ascites. Large mass infiltrating most of the right hepatic lobe. Questionable biliary dilatation. Further assessment     10/27  Pt is alert and awake, resting comfortably in bed. Reports no complaints today. Denies any pain or discomfort. No acute changes in condition.      Labs  BUN 49  Creatinine 4.78  eGFR 12   PTH level pending    Patient was seen by the nephrology and also seen by the cardiology during this admission    Cardiology adjusting medication add beta-blocker and Cardizem also seen by nephrology regarding dialysis patient have metastatic lung disease plan for discharge home with hospice    Discussed with the     Medication reconciliation done time shopping 35 minutes 50% time spent counseling and coordination of care    Signed:   Lucero Billings  10/27/2022  11:03 AM

## 2022-10-27 NOTE — PROGRESS NOTES
Called and reviewed discharge instructions with pt's sister (POA). Pt's sister stated she had no further questions at this time. Pt is discharged on hospice. Transportation set for 1500.      IV and tele to be removed upon transport's arrival.

## 2022-10-27 NOTE — PROGRESS NOTES
1050 am  Patient will discharge home this afternoon with [facility] [address] [place]. CM spoke to Toshia Cain, nurse with hospice, and all DME should be delivered by noon today at the sister's home. Transport will be arranged for 3 pm.     Discharge plan of care/case management plan validated with provider discharge order. 1310 pm  CM confirmed discharge transport time of 3 pm with [facility] and [facility] transport confirmed dialysis transport times of  at 5:50 am and return home at 9:45 am every Monday, Wednesday and Friday. CM called and informed sister Bandar of above.

## 2022-10-27 NOTE — PROGRESS NOTES
Problem: Falls - Risk of  Goal: *Absence of Falls  Description: Document Marlene Milner Fall Risk and appropriate interventions in the flowsheet.   Outcome: Progressing Towards Goal  Note: Fall Risk Interventions:  Mobility Interventions: Assess mobility with egress test, Bed/chair exit alarm, OT consult for ADLs, Patient to call before getting OOB, PT Consult for mobility concerns, Utilize walker, cane, or other assistive device    Mentation Interventions: Adequate sleep, hydration, pain control, Bed/chair exit alarm, Reorient patient    Medication Interventions: Bed/chair exit alarm, Patient to call before getting OOB, Teach patient to arise slowly    Elimination Interventions: Bed/chair exit alarm, Call light in reach    History of Falls Interventions: Bed/chair exit alarm         Problem: Patient Education: Go to Patient Education Activity  Goal: Patient/Family Education  Outcome: Progressing Towards Goal

## 2022-10-31 NOTE — PROGRESS NOTES
Physician Progress Note      Celestino Santacruz  CSN #:                  [id number]  :                       1950  ADMIT DATE:       10/23/2022 6:18 PM  [address] [place] DATE:        10/27/2022 5:32 PM  RESPONDING  PROVIDER #:        Cristi Muse MD          QUERY TEXT:    Pt admitted with chest pain. Pt noted to have metastatic lung cancer, HTN, and tachycardia . If possible, please document in progress notes and discharge summary if you are evaluating and/or treating any of the following: The medical record reflects the following:  Risk Factors: 80 yo male with COPD, metastatic lung cancer, HTN  Clinical Indicators: Chest CT showed 'Previously noted possible mass in left upper lobe orresponds to a 3.8 x 4.1 cm mass. There are multiple other nodular masses. Findings are suggestive of metastatic disease. The left upper lobe mass may represent a primary or metastasis as well';  Cardiology consult stated 'Tachycardia and HTN, will add beta-blockade and switch amlodipine to Cardizem'  Treatment: Cardizem, Toprol, Apresoline, Nitroglycerin    Thank you,  Maddy Barrientos RN, CCDS  Options provided:  -- Chest pain due to metastatic lung cancer  -- Chest pain due to hypertension  -- Chest pain due to tachycardia  -- Other - I will add my own diagnosis  -- Disagree - Not applicable / Not valid  -- Disagree - Clinically unable to determine / Unknown  -- Refer to Clinical Documentation Reviewer    PROVIDER RESPONSE TEXT:    This patient has chest pain due to metastatic lung cancer.     Query created by: Arely Estevez on 10/31/2022 8:28 AM      Electronically signed by:  Cristi Muse MD 10/31/2022 11:20 AM
